# Patient Record
Sex: MALE | Race: WHITE | Employment: OTHER | URBAN - METROPOLITAN AREA
[De-identification: names, ages, dates, MRNs, and addresses within clinical notes are randomized per-mention and may not be internally consistent; named-entity substitution may affect disease eponyms.]

---

## 2022-05-27 ENCOUNTER — APPOINTMENT (OUTPATIENT)
Dept: GENERAL RADIOLOGY | Age: 77
DRG: 287 | End: 2022-05-27
Attending: STUDENT IN AN ORGANIZED HEALTH CARE EDUCATION/TRAINING PROGRAM
Payer: MEDICARE

## 2022-05-27 ENCOUNTER — HOSPITAL ENCOUNTER (INPATIENT)
Age: 77
LOS: 4 days | Discharge: HOME | DRG: 287 | End: 2022-05-31
Attending: EMERGENCY MEDICINE | Admitting: FAMILY MEDICINE
Payer: MEDICARE

## 2022-05-27 DIAGNOSIS — R94.39 ABNORMAL STRESS TEST: Primary | ICD-10-CM

## 2022-05-27 DIAGNOSIS — I20.0 UNSTABLE ANGINA (HCC): ICD-10-CM

## 2022-05-27 PROBLEM — I10 HTN (HYPERTENSION): Status: ACTIVE | Noted: 2022-05-27

## 2022-05-27 PROBLEM — R94.31 ABNORMAL ECG DURING EXERCISE STRESS TEST: Status: ACTIVE | Noted: 2022-05-27

## 2022-05-27 PROBLEM — E11.9 TYPE 2 DIABETES MELLITUS (HCC): Status: ACTIVE | Noted: 2022-05-27

## 2022-05-27 PROBLEM — I25.10 CAD (CORONARY ARTERY DISEASE): Status: ACTIVE | Noted: 2022-05-27

## 2022-05-27 LAB
ALBUMIN SERPL-MCNC: 3.7 G/DL (ref 3.4–5)
ALBUMIN/GLOB SERPL: 0.9 {RATIO} (ref 0.8–1.7)
ALP SERPL-CCNC: 71 U/L (ref 45–117)
ALT SERPL-CCNC: 26 U/L (ref 16–61)
ANION GAP SERPL CALC-SCNC: 5 MMOL/L (ref 3–18)
AST SERPL-CCNC: 31 U/L (ref 10–38)
BASOPHILS # BLD: 0 K/UL (ref 0–0.1)
BASOPHILS NFR BLD: 0 % (ref 0–2)
BILIRUB SERPL-MCNC: 0.5 MG/DL (ref 0.2–1)
BNP SERPL-MCNC: 29 PG/ML (ref 0–1800)
BUN SERPL-MCNC: 20 MG/DL (ref 7–18)
BUN/CREAT SERPL: 20 (ref 12–20)
CALCIUM SERPL-MCNC: 9.6 MG/DL (ref 8.5–10.1)
CHLORIDE SERPL-SCNC: 104 MMOL/L (ref 100–111)
CO2 SERPL-SCNC: 31 MMOL/L (ref 21–32)
CREAT SERPL-MCNC: 0.99 MG/DL (ref 0.6–1.3)
DIFFERENTIAL METHOD BLD: ABNORMAL
EOSINOPHIL # BLD: 0.5 K/UL (ref 0–0.4)
EOSINOPHIL NFR BLD: 5 % (ref 0–5)
ERYTHROCYTE [DISTWIDTH] IN BLOOD BY AUTOMATED COUNT: 12.7 % (ref 11.6–14.5)
GLOBULIN SER CALC-MCNC: 4 G/DL (ref 2–4)
GLUCOSE BLD STRIP.AUTO-MCNC: 187 MG/DL (ref 70–110)
GLUCOSE SERPL-MCNC: 106 MG/DL (ref 74–99)
HCT VFR BLD AUTO: 44.8 % (ref 36–48)
HGB BLD-MCNC: 14.8 G/DL (ref 13–16)
IMM GRANULOCYTES # BLD AUTO: 0 K/UL (ref 0–0.04)
IMM GRANULOCYTES NFR BLD AUTO: 0 % (ref 0–0.5)
LYMPHOCYTES # BLD: 2.2 K/UL (ref 0.9–3.6)
LYMPHOCYTES NFR BLD: 23 % (ref 21–52)
MCH RBC QN AUTO: 31.6 PG (ref 24–34)
MCHC RBC AUTO-ENTMCNC: 33 G/DL (ref 31–37)
MCV RBC AUTO: 95.5 FL (ref 78–100)
MONOCYTES # BLD: 1.2 K/UL (ref 0.05–1.2)
MONOCYTES NFR BLD: 13 % (ref 3–10)
NEUTS SEG # BLD: 5.7 K/UL (ref 1.8–8)
NEUTS SEG NFR BLD: 59 % (ref 40–73)
NRBC # BLD: 0 K/UL (ref 0–0.01)
NRBC BLD-RTO: 0 PER 100 WBC
PLATELET # BLD AUTO: 198 K/UL (ref 135–420)
PMV BLD AUTO: 9.5 FL (ref 9.2–11.8)
POTASSIUM SERPL-SCNC: 4.1 MMOL/L (ref 3.5–5.5)
PROT SERPL-MCNC: 7.7 G/DL (ref 6.4–8.2)
RBC # BLD AUTO: 4.69 M/UL (ref 4.35–5.65)
SODIUM SERPL-SCNC: 140 MMOL/L (ref 136–145)
TROPONIN-HIGH SENSITIVITY: 26 NG/L (ref 0–78)
WBC # BLD AUTO: 9.6 K/UL (ref 4.6–13.2)

## 2022-05-27 PROCEDURE — 71045 X-RAY EXAM CHEST 1 VIEW: CPT

## 2022-05-27 PROCEDURE — 74011636637 HC RX REV CODE- 636/637: Performed by: FAMILY MEDICINE

## 2022-05-27 PROCEDURE — 74011250636 HC RX REV CODE- 250/636: Performed by: FAMILY MEDICINE

## 2022-05-27 PROCEDURE — 65270000046 HC RM TELEMETRY

## 2022-05-27 PROCEDURE — 85025 COMPLETE CBC W/AUTO DIFF WBC: CPT

## 2022-05-27 PROCEDURE — 82962 GLUCOSE BLOOD TEST: CPT

## 2022-05-27 PROCEDURE — 74011000250 HC RX REV CODE- 250: Performed by: FAMILY MEDICINE

## 2022-05-27 PROCEDURE — 83036 HEMOGLOBIN GLYCOSYLATED A1C: CPT

## 2022-05-27 PROCEDURE — 83880 ASSAY OF NATRIURETIC PEPTIDE: CPT

## 2022-05-27 PROCEDURE — 84484 ASSAY OF TROPONIN QUANT: CPT

## 2022-05-27 PROCEDURE — 93005 ELECTROCARDIOGRAM TRACING: CPT

## 2022-05-27 PROCEDURE — 80053 COMPREHEN METABOLIC PANEL: CPT

## 2022-05-27 PROCEDURE — 99285 EMERGENCY DEPT VISIT HI MDM: CPT

## 2022-05-27 PROCEDURE — 74011250637 HC RX REV CODE- 250/637: Performed by: FAMILY MEDICINE

## 2022-05-27 RX ORDER — POLYETHYLENE GLYCOL 3350 17 G/17G
17 POWDER, FOR SOLUTION ORAL DAILY PRN
Status: DISCONTINUED | OUTPATIENT
Start: 2022-05-27 | End: 2022-05-31 | Stop reason: HOSPADM

## 2022-05-27 RX ORDER — ACETAMINOPHEN 650 MG/1
650 SUPPOSITORY RECTAL
Status: DISCONTINUED | OUTPATIENT
Start: 2022-05-27 | End: 2022-05-31 | Stop reason: HOSPADM

## 2022-05-27 RX ORDER — FAMOTIDINE 20 MG/1
20 TABLET, FILM COATED ORAL 2 TIMES DAILY
Status: DISCONTINUED | OUTPATIENT
Start: 2022-05-27 | End: 2022-05-31 | Stop reason: HOSPADM

## 2022-05-27 RX ORDER — DEXTROSE MONOHYDRATE 100 MG/ML
0-250 INJECTION, SOLUTION INTRAVENOUS AS NEEDED
Status: DISCONTINUED | OUTPATIENT
Start: 2022-05-27 | End: 2022-05-31 | Stop reason: HOSPADM

## 2022-05-27 RX ORDER — ACETAMINOPHEN 325 MG/1
650 TABLET ORAL
Status: DISCONTINUED | OUTPATIENT
Start: 2022-05-27 | End: 2022-05-31 | Stop reason: HOSPADM

## 2022-05-27 RX ORDER — OMEGA-3-ACID ETHYL ESTERS 1 G/1
1 CAPSULE, LIQUID FILLED ORAL 2 TIMES DAILY WITH MEALS
Status: DISCONTINUED | OUTPATIENT
Start: 2022-05-28 | End: 2022-05-31 | Stop reason: HOSPADM

## 2022-05-27 RX ORDER — ENOXAPARIN SODIUM 100 MG/ML
1 INJECTION SUBCUTANEOUS EVERY 12 HOURS
Status: DISCONTINUED | OUTPATIENT
Start: 2022-05-27 | End: 2022-05-30

## 2022-05-27 RX ORDER — ONDANSETRON 2 MG/ML
4 INJECTION INTRAMUSCULAR; INTRAVENOUS
Status: DISCONTINUED | OUTPATIENT
Start: 2022-05-27 | End: 2022-05-31 | Stop reason: HOSPADM

## 2022-05-27 RX ORDER — LOSARTAN POTASSIUM 50 MG/1
50 TABLET ORAL DAILY
Status: DISCONTINUED | OUTPATIENT
Start: 2022-05-28 | End: 2022-05-31 | Stop reason: HOSPADM

## 2022-05-27 RX ORDER — SODIUM CHLORIDE 0.9 % (FLUSH) 0.9 %
5-40 SYRINGE (ML) INJECTION AS NEEDED
Status: DISCONTINUED | OUTPATIENT
Start: 2022-05-27 | End: 2022-05-31 | Stop reason: HOSPADM

## 2022-05-27 RX ORDER — GUAIFENESIN 100 MG/5ML
81 LIQUID (ML) ORAL
Status: DISCONTINUED | OUTPATIENT
Start: 2022-05-27 | End: 2022-05-30

## 2022-05-27 RX ORDER — METOPROLOL TARTRATE 25 MG/1
25 TABLET, FILM COATED ORAL DAILY
Status: DISCONTINUED | OUTPATIENT
Start: 2022-05-28 | End: 2022-05-30

## 2022-05-27 RX ORDER — ATORVASTATIN CALCIUM 20 MG/1
80 TABLET, FILM COATED ORAL
Status: DISCONTINUED | OUTPATIENT
Start: 2022-05-27 | End: 2022-05-31 | Stop reason: HOSPADM

## 2022-05-27 RX ORDER — MAGNESIUM SULFATE 100 %
16 CRYSTALS MISCELLANEOUS AS NEEDED
Status: DISCONTINUED | OUTPATIENT
Start: 2022-05-27 | End: 2022-05-31 | Stop reason: HOSPADM

## 2022-05-27 RX ORDER — METOPROLOL TARTRATE 25 MG/1
25 TABLET, FILM COATED ORAL
COMMUNITY
End: 2022-05-31

## 2022-05-27 RX ORDER — OMEGA-3-ACID ETHYL ESTERS 1 G/1
2 CAPSULE, LIQUID FILLED ORAL 2 TIMES DAILY
COMMUNITY

## 2022-05-27 RX ORDER — LORATADINE 10 MG/1
10 TABLET ORAL DAILY
COMMUNITY

## 2022-05-27 RX ORDER — INSULIN LISPRO 100 [IU]/ML
INJECTION, SOLUTION INTRAVENOUS; SUBCUTANEOUS
Status: DISCONTINUED | OUTPATIENT
Start: 2022-05-27 | End: 2022-05-31 | Stop reason: HOSPADM

## 2022-05-27 RX ORDER — ONDANSETRON 4 MG/1
4 TABLET, ORALLY DISINTEGRATING ORAL
Status: DISCONTINUED | OUTPATIENT
Start: 2022-05-27 | End: 2022-05-31 | Stop reason: HOSPADM

## 2022-05-27 RX ORDER — SODIUM CHLORIDE 0.9 % (FLUSH) 0.9 %
5-40 SYRINGE (ML) INJECTION EVERY 8 HOURS
Status: DISCONTINUED | OUTPATIENT
Start: 2022-05-27 | End: 2022-05-31 | Stop reason: HOSPADM

## 2022-05-27 RX ADMIN — INSULIN LISPRO 2 UNITS: 100 INJECTION, SOLUTION INTRAVENOUS; SUBCUTANEOUS at 23:47

## 2022-05-27 RX ADMIN — ATORVASTATIN CALCIUM 80 MG: 20 TABLET, FILM COATED ORAL at 23:20

## 2022-05-27 RX ADMIN — FAMOTIDINE 20 MG: 20 TABLET, FILM COATED ORAL at 23:20

## 2022-05-27 RX ADMIN — SODIUM CHLORIDE, PRESERVATIVE FREE 10 ML: 5 INJECTION INTRAVENOUS at 22:00

## 2022-05-27 RX ADMIN — ASPIRIN 81 MG: 81 TABLET, CHEWABLE ORAL at 23:20

## 2022-05-27 RX ADMIN — ENOXAPARIN SODIUM 70 MG: 100 INJECTION SUBCUTANEOUS at 23:21

## 2022-05-27 RX ADMIN — OMEGA-3-ACID ETHYL ESTERS 1000 MG: 1 CAPSULE, LIQUID FILLED ORAL at 23:20

## 2022-05-27 NOTE — Clinical Note
TRANSFER - IN REPORT:     Verbal report received from: HR. Report consisted of patient's Situation, Background, Assessment and   Recommendations(SBAR). Opportunity for questions and clarification was provided. Assessment completed upon patient's arrival to unit and care assumed. Patient transported with a Registered Nurse.

## 2022-05-27 NOTE — Clinical Note
TRANSFER - OUT REPORT:     Verbal report given to: HR. Report consisted of patient's Situation, Background, Assessment and   Recommendations(SBAR). Opportunity for questions and clarification was provided. Patient transported with a Registered Nurse. Patient transported to: recovery.

## 2022-05-27 NOTE — ED PROVIDER NOTES
78-year-old male with a past medical history of diabetes hypertension high cholesterol and kidney stones presents to the emergency department because of an abnormal stress test.  Patient is a Dr. Leesa Saldaña patient. For the past 5 to 6 months he has noticed that when he exerts himself doing yard work he gets short of breath with after 20 minutes. Normally he goes to the gym on the treadmill and lifts weights but does not have issues when he is doing that.  2 months ago patient went to Dr. Leesa Saldaña and said that when he bends over he gets lightheaded and dizzy. Is only scheduled him for stress test today. It was reported that he had ST elevation with exertion. Patient has no pain during interview. Dr. Leesa Saldaña called him and told to come to the ER. He told him that he was going to be admitted. Patient takes an aspirin a day. He is in the room with his wife. Both are pleasant without any active issues. No other issues expressed. Past Medical History:   Diagnosis Date    Chronic kidney disease     stones    Diabetes ([facility] [address]     Hypertension     Tinnitus of both ears        Past Surgical History:   Procedure Laterality Date    HX ORTHOPAEDIC Left 1968    amputation thumb    HX UROLOGICAL  15    lithotripsy         History reviewed. No pertinent family history. Social History     Socioeconomic History    Marital status:      Spouse name: Not on file    Number of children: Not on file    Years of education: Not on file    Highest education level: Not on file   Occupational History    Not on file   Tobacco Use    Smoking status: Former Smoker     Quit date: 1972     Years since quittin.4    Smokeless tobacco: Never Used   Substance and Sexual Activity    Alcohol use:  Yes     Alcohol/week: 1.7 standard drinks     Types: 2 drink(s) per week    Drug use: No    Sexual activity: Not on file   Other Topics Concern    Not on file   Social History Narrative    Not on file Social Determinants of Health     Financial Resource Strain:     Difficulty of Paying Living Expenses: Not on file   Food Insecurity:     Worried About Running Out of Food in the Last Year: Not on file    Lynn of Food in the Last Year: Not on file   Transportation Needs:     Lack of Transportation (Medical): Not on file    Lack of Transportation (Non-Medical): Not on file   Physical Activity:     Days of Exercise per Week: Not on file    Minutes of Exercise per Session: Not on file   Stress:     Feeling of Stress : Not on file   Social Connections:     Frequency of Communication with Friends and Family: Not on file    Frequency of Social Gatherings with Friends and Family: Not on file    Attends Scientologist Services: Not on file    Active Member of [address] [place] [address] [employer] or Organizations: Not on file    Attends Club or Organization Meetings: Not on file    Marital Status: Not on file   Intimate Partner Violence:     Fear of Current or Ex-Partner: Not on file    Emotionally Abused: Not on file    Physically Abused: Not on file    Sexually Abused: Not on file   Housing Stability:     Unable to Pay for Housing in the Last Year: Not on file    Number of [place] in the Last Year: Not on file    Unstable Housing in the Last Year: Not on file         ALLERGIES: Patient has no known allergies. Review of Systems   Constitutional: Negative. HENT: Negative. Respiratory: Positive for shortness of breath. Cardiovascular: Negative. Gastrointestinal: Negative. Genitourinary: Negative. Musculoskeletal: Negative. Neurological: Positive for dizziness. Hematological: Negative. Psychiatric/Behavioral: Negative. All other systems reviewed and are negative. Vitals:    05/27/22 1829 05/27/22 1850   BP: 135/71 (!) 141/66   Pulse: (!) 57    Resp: 18    Temp: 98.8 °F (37.1 °C)    SpO2: 97% 98%   Weight: 74.4 kg (164 lb)             Physical Exam  Vitals and nursing note reviewed. Constitutional:       Appearance: He is well-developed. HENT:      Head: Normocephalic and atraumatic. Nose: Nose normal.   Eyes:      General: No scleral icterus. Right eye: No discharge. Left eye: No discharge. Conjunctiva/sclera: Conjunctivae normal.      Pupils: Pupils are equal, round, and reactive to light. Neck:      Thyroid: No thyromegaly. Vascular: No JVD. Trachea: No tracheal deviation. Cardiovascular:      Rate and Rhythm: Normal rate and regular rhythm. Heart sounds: Normal heart sounds. No murmur heard. No friction rub. No gallop. Pulmonary:      Effort: Pulmonary effort is normal. No respiratory distress. Breath sounds: Normal breath sounds. No stridor. No wheezing or rales. Chest:      Chest wall: No tenderness. Abdominal:      General: Bowel sounds are normal. There is no distension. Palpations: Abdomen is soft. There is no mass. Tenderness: There is no abdominal tenderness. There is no guarding or rebound. Musculoskeletal:         General: No tenderness. Normal range of motion. Cervical back: Normal range of motion and neck supple. Lymphadenopathy:      Cervical: No cervical adenopathy. Skin:     General: Skin is warm and dry. Capillary Refill: Capillary refill takes less than 2 seconds. Coloration: Skin is not pale. Findings: No erythema or rash. Neurological:      General: No focal deficit present. Mental Status: He is alert and oriented to person, place, and time. Cranial Nerves: No cranial nerve deficit. Motor: No abnormal muscle tone. Coordination: Coordination normal.      Deep Tendon Reflexes: Reflexes normal.   Psychiatric:         Behavior: Behavior normal.         Thought Content:  Thought content normal.         Judgment: Judgment normal.          MDM  Number of Diagnoses or Management Options  Abnormal stress test  Diagnosis management comments: Previous ED attending spoke to Dr. Ivar Cooks. Patient takes an aspirin a day. We will contact 70 if there is gross abnormality. EKG sinus bradycardia at 59 normal axis normal intervals. Patient has no complaints in the emergency department. Waiting for labs and chest x-ray will admit to telemetry service for catheterization. Differential diagnosis PE ACS unstable angina MI pneumothorax      Spoke to Dr. Bakari Peck who agrees to do admission. Dr. Villegas Nicely spoke to Dr. Ivar Cooks who is in the emergency department and talk to the patient. They are going to give beta-blocker full dose statin and Lovenox. Hospitalist will write for the medication. He already took his aspirin at home. From my standpoint there is nothing else for me to do.   Admit       Amount and/or Complexity of Data Reviewed  Clinical lab tests: ordered and reviewed  Tests in the radiology section of CPT®: ordered and reviewed  Tests in the medicine section of CPT®: reviewed and ordered  Discussion of test results with the performing providers: yes  Decide to obtain previous medical records or to obtain history from someone other than the patient: yes  Obtain history from someone other than the patient: yes  Review and summarize past medical records: yes  Independent visualization of images, tracings, or specimens: yes    Risk of Complications, Morbidity, and/or Mortality  Presenting problems: moderate  Diagnostic procedures: moderate  Management options: moderate    Patient Progress  Patient progress: stable         Procedures

## 2022-05-27 NOTE — Clinical Note
Contrast Dose Calculator:   Patient's age: 77.   Patient's sex: Male. Patient weight (kg) = 74.4. Creatinine level (mg/dL) = 0.88. Creatinine clearance (mL/min): 76.33. Max Contrast dose per Creatinine Cl calculator = 171.73 mL.

## 2022-05-27 NOTE — PROGRESS NOTES
Pt placed on HFNC in ED at this time:       05/27/22 1948   Vitals   Resp Rate 20   O2 Sat (%) 100 %   Oxygen Therapy   Pulse via Oximetry 100 beats per minute   O2 Device Heated; Hi flow nasal cannula   O2 Flow Rate (L/min) 50 l/min   O2 Temperature 93.2 °F (34 °C)   FIO2 (%) 21 %

## 2022-05-27 NOTE — ED TRIAGE NOTES
Patient states had stressed test with Dr. Micah Briones today advised to come to ED for abnormal results patient has no complaints

## 2022-05-28 LAB
ALBUMIN SERPL-MCNC: 3.1 G/DL (ref 3.4–5)
ALBUMIN/GLOB SERPL: 0.9 {RATIO} (ref 0.8–1.7)
ALP SERPL-CCNC: 62 U/L (ref 45–117)
ALT SERPL-CCNC: 23 U/L (ref 16–61)
ANION GAP SERPL CALC-SCNC: 4 MMOL/L (ref 3–18)
AST SERPL-CCNC: 27 U/L (ref 10–38)
ATRIAL RATE: 59 BPM
BILIRUB SERPL-MCNC: 0.5 MG/DL (ref 0.2–1)
BUN SERPL-MCNC: 20 MG/DL (ref 7–18)
BUN/CREAT SERPL: 22 (ref 12–20)
CALCIUM SERPL-MCNC: 9.1 MG/DL (ref 8.5–10.1)
CALCULATED P AXIS, ECG09: 15 DEGREES
CALCULATED R AXIS, ECG10: -22 DEGREES
CALCULATED T AXIS, ECG11: -4 DEGREES
CHLORIDE SERPL-SCNC: 106 MMOL/L (ref 100–111)
CO2 SERPL-SCNC: 31 MMOL/L (ref 21–32)
CREAT SERPL-MCNC: 0.92 MG/DL (ref 0.6–1.3)
DIAGNOSIS, 93000: NORMAL
ERYTHROCYTE [DISTWIDTH] IN BLOOD BY AUTOMATED COUNT: 12.6 % (ref 11.6–14.5)
GLOBULIN SER CALC-MCNC: 3.5 G/DL (ref 2–4)
GLUCOSE BLD STRIP.AUTO-MCNC: 111 MG/DL (ref 70–110)
GLUCOSE BLD STRIP.AUTO-MCNC: 118 MG/DL (ref 70–110)
GLUCOSE BLD STRIP.AUTO-MCNC: 123 MG/DL (ref 70–110)
GLUCOSE BLD STRIP.AUTO-MCNC: 93 MG/DL (ref 70–110)
GLUCOSE SERPL-MCNC: 106 MG/DL (ref 74–99)
HBA1C MFR BLD: 6.2 % (ref 4.2–5.6)
HCT VFR BLD AUTO: 41.3 % (ref 36–48)
HGB BLD-MCNC: 14 G/DL (ref 13–16)
MCH RBC QN AUTO: 31.8 PG (ref 24–34)
MCHC RBC AUTO-ENTMCNC: 33.9 G/DL (ref 31–37)
MCV RBC AUTO: 93.9 FL (ref 78–100)
NRBC # BLD: 0 K/UL (ref 0–0.01)
NRBC BLD-RTO: 0 PER 100 WBC
P-R INTERVAL, ECG05: 174 MS
PLATELET # BLD AUTO: 174 K/UL (ref 135–420)
PMV BLD AUTO: 9.7 FL (ref 9.2–11.8)
POTASSIUM SERPL-SCNC: 4 MMOL/L (ref 3.5–5.5)
PROT SERPL-MCNC: 6.6 G/DL (ref 6.4–8.2)
Q-T INTERVAL, ECG07: 420 MS
QRS DURATION, ECG06: 80 MS
QTC CALCULATION (BEZET), ECG08: 415 MS
RBC # BLD AUTO: 4.4 M/UL (ref 4.35–5.65)
SODIUM SERPL-SCNC: 141 MMOL/L (ref 136–145)
TROPONIN-HIGH SENSITIVITY: 12 NG/L (ref 0–78)
TROPONIN-HIGH SENSITIVITY: 17 NG/L (ref 0–78)
VENTRICULAR RATE, ECG03: 59 BPM
WBC # BLD AUTO: 10.9 K/UL (ref 4.6–13.2)

## 2022-05-28 PROCEDURE — 74011250636 HC RX REV CODE- 250/636: Performed by: FAMILY MEDICINE

## 2022-05-28 PROCEDURE — 74011000250 HC RX REV CODE- 250: Performed by: FAMILY MEDICINE

## 2022-05-28 PROCEDURE — 36415 COLL VENOUS BLD VENIPUNCTURE: CPT

## 2022-05-28 PROCEDURE — 80053 COMPREHEN METABOLIC PANEL: CPT

## 2022-05-28 PROCEDURE — 74011250637 HC RX REV CODE- 250/637: Performed by: FAMILY MEDICINE

## 2022-05-28 PROCEDURE — 84484 ASSAY OF TROPONIN QUANT: CPT

## 2022-05-28 PROCEDURE — 99232 SBSQ HOSP IP/OBS MODERATE 35: CPT | Performed by: INTERNAL MEDICINE

## 2022-05-28 PROCEDURE — 82962 GLUCOSE BLOOD TEST: CPT

## 2022-05-28 PROCEDURE — 65270000046 HC RM TELEMETRY

## 2022-05-28 PROCEDURE — 85027 COMPLETE CBC AUTOMATED: CPT

## 2022-05-28 RX ADMIN — ENOXAPARIN SODIUM 70 MG: 100 INJECTION SUBCUTANEOUS at 21:38

## 2022-05-28 RX ADMIN — ENOXAPARIN SODIUM 70 MG: 100 INJECTION SUBCUTANEOUS at 08:39

## 2022-05-28 RX ADMIN — OMEGA-3-ACID ETHYL ESTERS 1000 MG: 1 CAPSULE, LIQUID FILLED ORAL at 17:51

## 2022-05-28 RX ADMIN — ATORVASTATIN CALCIUM 80 MG: 20 TABLET, FILM COATED ORAL at 21:39

## 2022-05-28 RX ADMIN — METOPROLOL TARTRATE 25 MG: 25 TABLET, FILM COATED ORAL at 09:00

## 2022-05-28 RX ADMIN — FAMOTIDINE 20 MG: 20 TABLET, FILM COATED ORAL at 21:39

## 2022-05-28 RX ADMIN — SODIUM CHLORIDE, PRESERVATIVE FREE 10 ML: 5 INJECTION INTRAVENOUS at 06:53

## 2022-05-28 RX ADMIN — SODIUM CHLORIDE, PRESERVATIVE FREE 10 ML: 5 INJECTION INTRAVENOUS at 14:00

## 2022-05-28 RX ADMIN — ASPIRIN 81 MG: 81 TABLET, CHEWABLE ORAL at 21:40

## 2022-05-28 RX ADMIN — FAMOTIDINE 20 MG: 20 TABLET, FILM COATED ORAL at 08:39

## 2022-05-28 RX ADMIN — OMEGA-3-ACID ETHYL ESTERS 1000 MG: 1 CAPSULE, LIQUID FILLED ORAL at 08:39

## 2022-05-28 RX ADMIN — LOSARTAN POTASSIUM 50 MG: 50 TABLET, FILM COATED ORAL at 09:00

## 2022-05-28 RX ADMIN — SODIUM CHLORIDE, PRESERVATIVE FREE 5 ML: 5 INJECTION INTRAVENOUS at 21:40

## 2022-05-28 NOTE — PROGRESS NOTES
Shift uneventful. Bedside and Verbal shift change report given to [facility] RN (oncoming nurse) by Khadra Queen RN (offgoing nurse). Report included the following information SBAR, Kardex, MAR, Recent Results and Cardiac Rhythm .

## 2022-05-28 NOTE — PROGRESS NOTES
Problem: Patient Education: Go to Patient Education Activity  Goal: Patient/Family Education  Outcome: Progressing Towards Goal     Problem: Unstable angina/NSTEMI: Day of Admission/Day 1  Goal: Off Pathway (Use only if patient is Off Pathway)  Outcome: Progressing Towards Goal  Goal: Activity/Safety  Outcome: Progressing Towards Goal  Goal: Consults, if ordered  Outcome: Progressing Towards Goal  Goal: Diagnostic Test/Procedures  Outcome: Progressing Towards Goal  Goal: Nutrition/Diet  Outcome: Progressing Towards Goal  Goal: Medications  Outcome: Progressing Towards Goal  Goal: Respiratory  Outcome: Progressing Towards Goal  Goal: Treatments/Interventions/Procedures  Outcome: Progressing Towards Goal  Goal: Psychosocial  Outcome: Progressing Towards Goal  Goal: *Hemodynamically stable  Outcome: Progressing Towards Goal  Goal: *Optimal pain control at patient's stated goal  Outcome: Progressing Towards Goal  Goal: *Lungs clear or at baseline  Outcome: Progressing Towards Goal

## 2022-05-28 NOTE — PROGRESS NOTES
Hospitalist Progress Note    Patient: Cherie Lam MRN: [id number]  CSN: [id number]    YOB: 1945  Age: 77 y.o. Sex: male    DOA: 5/27/2022 LOS:  LOS: 1 day            Assessment/Plan   1. Unstable angina- chest pain free overnight, cardiac enzymes neg  2. HTN  3. DM2    Plan:  - lipitor, asa, metoprolol, tx dose lovenox  - monitro glucose  - for cath tuesday      Patient Active Problem List   Diagnosis Code    Family history of colon cancer Z80.0    Unstable angina ([facility] [address] I20.0    HTN (hypertension) I10    Abnormal ECG during exercise stress test R94.31    Type 2 diabetes mellitus (HCC) E11.9    CAD (coronary artery disease) I25.10               Subjective:    cc: \" im ok\"  No acute events overnight      REVIEW OF SYSTEMS:  General: No fevers or chills. Cardiovascular: No chest pain or pressure. No palpitations. Pulmonary: No shortness of breath. Gastrointestinal: No nausea, vomiting. Objective:        Vital signs/Intake and Output:  Visit Vitals  BP (!) 146/68 (BP 1 Location: Right upper arm, BP Patient Position: At rest)   Pulse (!) 55   Temp 98 °F (36.7 °C)   Resp 16   Wt 74.4 kg (164 lb)   SpO2 99%   BMI 24.22 kg/m²     Current Shift:  No intake/output data recorded. Last three shifts:  No intake/output data recorded. Body mass index is 24.22 kg/m².     Physical Exam:  GEN: Alert and oriented times three NAD  EYES: conjunctiva normal, lids with out lesions  HEENT: MMM, No thyromegaly, no lymphadenopathy  HEART: RRR +S1 +S2, no JVD, pulses 2+ distally  LUNGS: CTA B/L no wheezes, rales or rhonchi  ABDOMEN: + BS, soft NT/ND no organomegaly,  No rebound  EXTREMITIES: No edema cyanosis, cap refill normal   SKIN: no rashes or skin breakdown, no nodules, normal turgor  Current Facility-Administered Medications   Medication Dose Route Frequency    sodium chloride (NS) flush 5-40 mL  5-40 mL IntraVENous Q8H    sodium chloride (NS) flush 5-40 mL  5-40 mL IntraVENous PRN    acetaminophen (TYLENOL) tablet 650 mg  650 mg Oral Q6H PRN    Or    acetaminophen (TYLENOL) suppository 650 mg  650 mg Rectal Q6H PRN    polyethylene glycol (MIRALAX) packet 17 g  17 g Oral DAILY PRN    ondansetron (ZOFRAN ODT) tablet 4 mg  4 mg Oral Q8H PRN    Or    ondansetron (ZOFRAN) injection 4 mg  4 mg IntraVENous Q6H PRN    famotidine (PEPCID) tablet 20 mg  20 mg Oral BID    enoxaparin (LOVENOX) injection 70 mg  1 mg/kg SubCUTAneous Q12H    aspirin chewable tablet 81 mg  81 mg Oral QHS    atorvastatin (LIPITOR) tablet 80 mg  80 mg Oral QHS    losartan (COZAAR) tablet 50 mg  50 mg Oral DAILY    insulin lispro (HUMALOG) injection   SubCUTAneous AC&HS    glucose chewable tablet 16 g  16 g Oral PRN    glucagon (GLUCAGEN) injection 1 mg  1 mg IntraMUSCular PRN    dextrose 10% infusion 0-250 mL  0-250 mL IntraVENous PRN    metoprolol tartrate (LOPRESSOR) tablet 25 mg  25 mg Oral DAILY    omega-3 acid ethyl esters (LOVAZA) capsule 1,000 mg  1 g Oral BID WITH MEALS         All the patient's labs over the past 24 hours were reviewed both during my initial daily workflow process and at the time notated as \"note time\" in [facility]. (It is not time stamped separately in this workflow.)  Select labs are listed below.         Labs: Results:       Chemistry Recent Labs     05/28/22 0130 05/27/22  1839   * 106*    140   K 4.0 4.1    104   CO2 31 31   BUN 20* 20*   CREA 0.92 0.99   CA 9.1 9.6   AGAP 4 5   BUCR 22* 20   AP 62 71   TP 6.6 7.7   ALB 3.1* 3.7   GLOB 3.5 4.0   AGRAT 0.9 0.9      CBC w/Diff Recent Labs     05/28/22 0130 05/27/22  1839   WBC 10.9 9.6   RBC 4.40 4.69   HGB 14.0 14.8   HCT 41.3 44.8    198   GRANS  --  59   LYMPH  --  23   EOS  --  5                      Liver Enzymes Recent Labs     05/28/22 0130   TP 6.6   ALB 3.1*   AP 62          Procedures/imaging: see electronic medical records for all procedures/Xrays and details which were not copied into this note but were reviewed prior to creation of R Bart Fuchs 1, DO  Internal Medicine/Geriatrics

## 2022-05-28 NOTE — PROGRESS NOTES
Cardiology Progress Note      5/28/2022 6:48 PM    Admit Date: 5/27/2022    Admit Diagnosis: Unstable angina ([facility] [address] [I20.0]      Subjective: Radha Rodriguez denies chest pain, chest pressure/discomfort. Visit Vitals  /61 (BP 1 Location: Right upper arm, BP Patient Position: Sitting)   Pulse (!) 53   Temp 97.4 °F (36.3 °C)   Resp 18   Wt 74.4 kg (164 lb)   SpO2 97%   BMI 24.22 kg/m²     Current Facility-Administered Medications   Medication Dose Route Frequency    sodium chloride (NS) flush 5-40 mL  5-40 mL IntraVENous Q8H    sodium chloride (NS) flush 5-40 mL  5-40 mL IntraVENous PRN    acetaminophen (TYLENOL) tablet 650 mg  650 mg Oral Q6H PRN    Or    acetaminophen (TYLENOL) suppository 650 mg  650 mg Rectal Q6H PRN    polyethylene glycol (MIRALAX) packet 17 g  17 g Oral DAILY PRN    ondansetron (ZOFRAN ODT) tablet 4 mg  4 mg Oral Q8H PRN    Or    ondansetron (ZOFRAN) injection 4 mg  4 mg IntraVENous Q6H PRN    famotidine (PEPCID) tablet 20 mg  20 mg Oral BID    enoxaparin (LOVENOX) injection 70 mg  1 mg/kg SubCUTAneous Q12H    aspirin chewable tablet 81 mg  81 mg Oral QHS    atorvastatin (LIPITOR) tablet 80 mg  80 mg Oral QHS    losartan (COZAAR) tablet 50 mg  50 mg Oral DAILY    insulin lispro (HUMALOG) injection   SubCUTAneous AC&HS    glucose chewable tablet 16 g  16 g Oral PRN    glucagon (GLUCAGEN) injection 1 mg  1 mg IntraMUSCular PRN    dextrose 10% infusion 0-250 mL  0-250 mL IntraVENous PRN    metoprolol tartrate (LOPRESSOR) tablet 25 mg  25 mg Oral DAILY    omega-3 acid ethyl esters (LOVAZA) capsule 1,000 mg  1 g Oral BID WITH MEALS         Objective:      Physical Exam:  Visit Vitals  /61 (BP 1 Location: Right upper arm, BP Patient Position: Sitting)   Pulse (!) 53   Temp 97.4 °F (36.3 °C)   Resp 18   Wt 74.4 kg (164 lb)   SpO2 97%   BMI 24.22 kg/m²     General Appearance:  Well developed, well nourished,alert and oriented x 3, and individual in no acute distress. Ears/Nose/Mouth/Throat:   Hearing grossly normal.         Neck: Supple. Chest:   Lungs clear to auscultation bilaterally. Cardiovascular:  Regular rate and rhythm, S1, S2 normal, no murmur. Abdomen:   Soft, non-tender, bowel sounds are active. Extremities: No edema bilaterally. Skin: Warm and dry. Data Review:   Labs:    Recent Results (from the past 24 hour(s))   GLUCOSE, POC    Collection Time: 05/27/22 11:43 PM   Result Value Ref Range    Glucose (POC) 187 (H) 70 - 814 mg/dL   METABOLIC PANEL, COMPREHENSIVE    Collection Time: 05/28/22  1:30 AM   Result Value Ref Range    Sodium 141 136 - 145 mmol/L    Potassium 4.0 3.5 - 5.5 mmol/L    Chloride 106 100 - 111 mmol/L    CO2 31 21 - 32 mmol/L    Anion gap 4 3.0 - 18 mmol/L    Glucose 106 (H) 74 - 99 mg/dL    BUN 20 (H) 7.0 - 18 MG/DL    Creatinine 0.92 0.6 - 1.3 MG/DL    BUN/Creatinine ratio 22 (H) 12 - 20      GFR est AA >60 >60 ml/min/1.73m2    GFR est non-AA >60 >60 ml/min/1.73m2    Calcium 9.1 8.5 - 10.1 MG/DL    Bilirubin, total 0.5 0.2 - 1.0 MG/DL    ALT (SGPT) 23 16 - 61 U/L    AST (SGOT) 27 10 - 38 U/L    Alk.  phosphatase 62 45 - 117 U/L    Protein, total 6.6 6.4 - 8.2 g/dL    Albumin 3.1 (L) 3.4 - 5.0 g/dL    Globulin 3.5 2.0 - 4.0 g/dL    A-G Ratio 0.9 0.8 - 1.7     CBC W/O DIFF    Collection Time: 05/28/22  1:30 AM   Result Value Ref Range    WBC 10.9 4.6 - 13.2 K/uL    RBC 4.40 4.35 - 5.65 M/uL    HGB 14.0 13.0 - 16.0 g/dL    HCT 41.3 36.0 - 48.0 %    MCV 93.9 78.0 - 100.0 FL    MCH 31.8 24.0 - 34.0 PG    MCHC 33.9 31.0 - 37.0 g/dL    RDW 12.6 11.6 - 14.5 %    PLATELET 779 520 - 894 K/uL    MPV 9.7 9.2 - 11.8 FL    NRBC 0.0 0  WBC    ABSOLUTE NRBC 0.00 0.00 - 0.01 K/uL   TROPONIN-HIGH SENSITIVITY    Collection Time: 05/28/22  1:30 AM   Result Value Ref Range    Troponin-High Sensitivity 17 0 - 78 ng/L   GLUCOSE, POC    Collection Time: 05/28/22  6:41 AM   Result Value Ref Range    Glucose (POC) 93 70 - 110 mg/dL TROPONIN-HIGH SENSITIVITY    Collection Time: 05/28/22  8:42 AM   Result Value Ref Range    Troponin-High Sensitivity 12 0 - 78 ng/L   GLUCOSE, POC    Collection Time: 05/28/22 11:10 AM   Result Value Ref Range    Glucose (POC) 111 (H) 70 - 110 mg/dL   GLUCOSE, POC    Collection Time: 05/28/22  3:36 PM   Result Value Ref Range    Glucose (POC) 123 (H) 70 - 110 mg/dL       Telemetry: normal sinus rhythm      Assessment:     Principal Problem:    Abnormal ECG during exercise stress test (5/27/2022)    Active Problems:    [facility] (5/27/2022)      HTN (hypertension) (5/27/2022)      Type 2 diabetes mellitus ([facility] (5/27/2022)      CAD (coronary artery disease) (5/27/2022)        Plan:     Patient remained stable with no chest pain. Troponins are not elevated. EKG showed sinus bradycardia, otherwise normal.  We will continue to monitor. For cath on Tuesday by Dr. Prabha Weavre.     Ivet Hawley MD

## 2022-05-28 NOTE — H&P
History & Physical    Patient: Linda Thrasher MRN: [id number]  CSN: [id number]    YOB: 1945  Age: 77 y.o. Sex: male      DOA: 5/27/2022  Primary Care Provider:  Park Gauthier MD      Assessment/Plan     Patient Active Problem List   Diagnosis Code    Family history of colon cancer Z80.0    Unstable angina ([facility] I20.0    HTN (hypertension) I10    Abnormal ECG during exercise stress test R94.31    Type 2 diabetes mellitus ([facility] E11.9    CAD (coronary artery disease) I25.10     78 y/o male with HTN, CAD, and type 2 diabetes mellitus is admitted with an abnormal exercise stress test and unstable angina. Unstable angina with abnormal EKG on stress test  --Telemetry  --Trend troponins  --Echocardiogram  --Full dose lovenox 1 mg/kg BID  --Cardiology consult  --Cardiac cath on Tuesday    HTN  --Continue home meds    Type 2 diabetes melllitus  --Sliding scale insulin  --Diabetic diet    CAD  --Continue daily ASA    Type 2 diabetes mellitus-not requiring medication  --Sliding scale insulin  --Diabetic cardiac diet    Full code    Prophylaxis: pepcid PO, lovenox SQ    Estimated length of stay : 4 nights    Shantel Sullivan MD  Nocturnist  ----------------------------------------------------------------------------------------------------------------------------------------------------------------------------  CC: abnormal stress test       HPI:     Linda Thrasher is a 77 y.o. male with HTN, CAD, and type 2 diabetes mellitus presents to the ED at the request of Dr. Mark Christensen (cardiology) for an abnormal stress test with ST elevations during the test. When he was in the [employer] years ago he had a cardiac cath showing a 90% blockage. However, he wasn't able to get a stent placed as he was not on active duty. He has never had a subsequent cardiac cath. For the past 5 months he has had exertional fatigue particularly with mowing the lawn, which seems to be accelerating over time.  He was scheduled for a stress test today. He is asymptomatic and denies chest pain, shortness of breath, or leg swelling. Past Medical History:   Diagnosis Date    Chronic kidney disease     stones    Diabetes ([address] 2012    Hypertension 2014    Tinnitus of both ears        Past Surgical History:   Procedure Laterality Date    HX ORTHOPAEDIC Left 1968    amputation thumb    HX UROLOGICAL  14    lithotripsy       Family History   Problem Relation Age of Onset    Diabetes Mother     Heart Disease Mother     Diabetes Father     Cancer Sister        Social History     Socioeconomic History    Marital status:    Tobacco Use    Smoking status: Former Smoker     Quit date: 1972     Years since quittin.4    Smokeless tobacco: Never Used   Vaping Use    Vaping Use: Never used   Substance and Sexual Activity    Alcohol use: Yes     Alcohol/week: 4.0 - 5.0 standard drinks     Types: 2 Standard drinks or equivalent, 2 - 3 Glasses of wine per week    Drug use: No       Prior to Admission medications    Medication Sig Start Date End Date Taking? Authorizing Provider   omega-3 acid ethyl esters (LOVAZA) 1 gram capsule Take 2 g by mouth two (2) times a day. Yes Provider, Historical   metoprolol tartrate (LOPRESSOR) 25 mg tablet Take 25 mg by mouth once over twenty-four (24) hours. Yes Provider, Historical   loratadine (CLARITIN) 10 mg tablet Take 10 mg by mouth daily. Yes Provider, Historical   losartan (COZAAR) 50 mg tablet Take 50 mg by mouth daily. Yes Provider, Historical   atorvastatin (LIPITOR) 80 mg tablet Take 80 mg by mouth nightly. Indications: HYPERCHOLESTEROLEMIA   Yes Provider, Historical   Aspirin, Buffered 81 mg tab Take  by mouth nightly. Yes Provider, Historical   metFORMIN (GLUCOPHAGE) 1,000 mg tablet Take 500 mg by mouth daily (with dinner).  Indications: TYPE 2 DIABETES MELLITUS  Patient not taking: Reported on 2022    Provider, Historical       No Known Allergies    Review of Systems  Gen: No fever, chills, malaise, weight loss/gain. Heent: No headache, rhinorrhea, sore throat. Heart: No chest pain, palpitations, ROMERO, pnd, or orthopnea. Resp: No cough, hemoptysis, wheezing and shortness of breath. GI: No nausea, vomiting, diarrhea, constipation, melena or hematochezia. : No urinary obstruction, dysuria or hematuria. Derm: No rash, new skin lesion or pruritis. Musc/skeletal: no bone or joint complaints. Vasc: No edema, cyanosis or claudication. Endo: No heat/cold intolerance, no polyuria,polydipsia or polyphagia. Neuro: No unilateral weakness, numbness, tingling. No seizures. Heme: No easy bruising or bleeding. Physical Exam:     Physical Exam:  Visit Vitals  /70 (BP 1 Location: Right upper arm, BP Patient Position: Sitting)   Pulse (!) 51   Temp 97.4 °F (36.3 °C)   Resp 16   Wt 74.4 kg (164 lb)   SpO2 99%   BMI 24.22 kg/m²           Temp (24hrs), Av.1 °F (36.7 °C), Min:97.4 °F (36.3 °C), Max:98.8 °F (37.1 °C)    No intake/output data recorded. No intake/output data recorded. General:  Awake, pleasant and cooperative, no distress. Head:  Normocephalic, without obvious abnormality, atraumatic. Eyes:  Conjunctivae/corneas clear, sclera anicteric. Neck: Supple, symmetrical, trachea midline. Lungs:   Clear to auscultation bilaterally. Heart:  Regular rate and rhythm, S1, S2 normal, no murmur, click, rub or gallop. Abdomen: Soft, non-tender. Bowel sounds normal. No masses,  No organomegaly. Extremities: Extremities normal, atraumatic, no cyanosis or edema. Capillary refill normal.   Pulses: 2+ and symmetric all extremities. Skin: Skin color pink, turgor normal. No rashes or lesions   Neurologic: No focal motor or sensory deficit. Labs Reviewed: All lab results for the last 24 hours reviewed.   Recent Results (from the past 24 hour(s))   EKG, 12 LEAD, INITIAL    Collection Time: 22  6:38 PM   Result Value Ref Range Ventricular Rate 59 BPM    Atrial Rate 59 BPM    P-R Interval 174 ms    QRS Duration 80 ms    Q-T Interval 420 ms    QTC Calculation (Bezet) 415 ms    Calculated P Axis 15 degrees    Calculated R Axis -22 degrees    Calculated T Axis -4 degrees    Diagnosis       Sinus bradycardia  Otherwise normal ECG  No previous ECGs available     CBC WITH AUTOMATED DIFF    Collection Time: 05/27/22  6:39 PM   Result Value Ref Range    WBC 9.6 4.6 - 13.2 K/uL    RBC 4.69 4.35 - 5.65 M/uL    HGB 14.8 13.0 - 16.0 g/dL    HCT 44.8 36.0 - 48.0 %    MCV 95.5 78.0 - 100.0 FL    MCH 31.6 24.0 - 34.0 PG    MCHC 33.0 31.0 - 37.0 g/dL    RDW 12.7 11.6 - 14.5 %    PLATELET 218 081 - 458 K/uL    MPV 9.5 9.2 - 11.8 FL    NRBC 0.0 0  WBC    ABSOLUTE NRBC 0.00 0.00 - 0.01 K/uL    NEUTROPHILS 59 40 - 73 %    LYMPHOCYTES 23 21 - 52 %    MONOCYTES 13 (H) 3 - 10 %    EOSINOPHILS 5 0 - 5 %    BASOPHILS 0 0 - 2 %    IMMATURE GRANULOCYTES 0 0.0 - 0.5 %    ABS. NEUTROPHILS 5.7 1.8 - 8.0 K/UL    ABS. LYMPHOCYTES 2.2 0.9 - 3.6 K/UL    ABS. MONOCYTES 1.2 0.05 - 1.2 K/UL    ABS. EOSINOPHILS 0.5 (H) 0.0 - 0.4 K/UL    ABS. BASOPHILS 0.0 0.0 - 0.1 K/UL    ABS. IMM. GRANS. 0.0 0.00 - 0.04 K/UL    DF AUTOMATED     METABOLIC PANEL, COMPREHENSIVE    Collection Time: 05/27/22  6:39 PM   Result Value Ref Range    Sodium 140 136 - 145 mmol/L    Potassium 4.1 3.5 - 5.5 mmol/L    Chloride 104 100 - 111 mmol/L    CO2 31 21 - 32 mmol/L    Anion gap 5 3.0 - 18 mmol/L    Glucose 106 (H) 74 - 99 mg/dL    BUN 20 (H) 7.0 - 18 MG/DL    Creatinine 0.99 0.6 - 1.3 MG/DL    BUN/Creatinine ratio 20 12 - 20      GFR est AA >60 >60 ml/min/1.73m2    GFR est non-AA >60 >60 ml/min/1.73m2    Calcium 9.6 8.5 - 10.1 MG/DL    Bilirubin, total 0.5 0.2 - 1.0 MG/DL    ALT (SGPT) 26 16 - 61 U/L    AST (SGOT) 31 10 - 38 U/L    Alk.  phosphatase 71 45 - 117 U/L    Protein, total 7.7 6.4 - 8.2 g/dL    Albumin 3.7 3.4 - 5.0 g/dL    Globulin 4.0 2.0 - 4.0 g/dL    A-G Ratio 0.9 0.8 - 1.7 TROPONIN-HIGH SENSITIVITY    Collection Time: 05/27/22  6:39 PM   Result Value Ref Range    Troponin-High Sensitivity 26 0 - 78 ng/L   NT-PRO BNP    Collection Time: 05/27/22  6:39 PM   Result Value Ref Range    NT pro-BNP 29 0 - 1,800 PG/ML   GLUCOSE, POC    Collection Time: 05/27/22 11:43 PM   Result Value Ref Range    Glucose (POC) 187 (H) 70 - 110 mg/dL     Results  XR CHEST PORT (Accession [id number]) (Order [id number])    Allergies       No Known Allergies     Exam Information    Status Exam Begun  Exam Ended    Final [99] 5/27/2022 18:41 5/27/2022  6:57 PM [id number]  6:57 PM     Result Information    Status: Final result (Exam End: 5/27/2022 18:57) Provider Status: Open       XR CHEST PORT: Patient Communication     Released  Not seen     Study Result    Narrative & Impression   EXAM:  AP Portable Chest X-ray 1 view      INDICATION: Chest pain     COMPARISON: None     _______________     FINDINGS:  Heart and mediastinal contours are within normal limits for portable  radiograph. There are increased reticular interstitial lung markings  bilaterally. No focal airspace disease. There are no pleural effusions. No acute  osseous findings.     ________________        IMPRESSION  Increased reticular interstitial lung markings probably lung apices. This may represent chronic lung changes however superimposed interstitial  pneumonia not excluded.

## 2022-05-28 NOTE — PROGRESS NOTES
78-year-old gentleman came to my office for outpatient exercise nuclear stress test for CAD with angina. He exercised for 10 minutes on treadmill with Timothy protocol and an of significant shortness of breath. EKG revealed ST elevation in inferior leads during exercise which resolved completely during recovery and patient was asymptomatic. Nuclear images revealed fixed inferior perfusion defect. In view of significant ST elevation during exercise patient was called back and advised to come to the emergency room. Echocardiogram was done in my office revealed no wall motion abnormality and normal EF few weeks ago. Serial EKG with cardiac enzymes Q 6-8 hours x3   Aspirin, beta-blocker, statin and full dose of Lovenox. Admit to telemetry unit. Plan discussed with patient and family member. Plan discussed with   Patient will be seen by Dr. Jesusita Amezquita during hospitalization on weekend  Plan discussed with ER physician and Dr. Milton James.

## 2022-05-29 LAB
ALBUMIN SERPL-MCNC: 3 G/DL (ref 3.4–5)
ALBUMIN/GLOB SERPL: 0.8 {RATIO} (ref 0.8–1.7)
ALP SERPL-CCNC: 64 U/L (ref 45–117)
ALT SERPL-CCNC: 22 U/L (ref 16–61)
ANION GAP SERPL CALC-SCNC: 4 MMOL/L (ref 3–18)
AST SERPL-CCNC: 23 U/L (ref 10–38)
BILIRUB SERPL-MCNC: 0.4 MG/DL (ref 0.2–1)
BUN SERPL-MCNC: 21 MG/DL (ref 7–18)
BUN/CREAT SERPL: 20 (ref 12–20)
CALCIUM SERPL-MCNC: 9.2 MG/DL (ref 8.5–10.1)
CHLORIDE SERPL-SCNC: 106 MMOL/L (ref 100–111)
CO2 SERPL-SCNC: 30 MMOL/L (ref 21–32)
CREAT SERPL-MCNC: 1.03 MG/DL (ref 0.6–1.3)
ERYTHROCYTE [DISTWIDTH] IN BLOOD BY AUTOMATED COUNT: 12.6 % (ref 11.6–14.5)
GLOBULIN SER CALC-MCNC: 3.8 G/DL (ref 2–4)
GLUCOSE BLD STRIP.AUTO-MCNC: 108 MG/DL (ref 70–110)
GLUCOSE BLD STRIP.AUTO-MCNC: 109 MG/DL (ref 70–110)
GLUCOSE BLD STRIP.AUTO-MCNC: 120 MG/DL (ref 70–110)
GLUCOSE BLD STRIP.AUTO-MCNC: 123 MG/DL (ref 70–110)
GLUCOSE SERPL-MCNC: 118 MG/DL (ref 74–99)
HCT VFR BLD AUTO: 40.9 % (ref 36–48)
HGB BLD-MCNC: 13.7 G/DL (ref 13–16)
MCH RBC QN AUTO: 31.6 PG (ref 24–34)
MCHC RBC AUTO-ENTMCNC: 33.5 G/DL (ref 31–37)
MCV RBC AUTO: 94.5 FL (ref 78–100)
NRBC # BLD: 0 K/UL (ref 0–0.01)
NRBC BLD-RTO: 0 PER 100 WBC
PLATELET # BLD AUTO: 171 K/UL (ref 135–420)
PMV BLD AUTO: 10.1 FL (ref 9.2–11.8)
POTASSIUM SERPL-SCNC: 3.8 MMOL/L (ref 3.5–5.5)
PROT SERPL-MCNC: 6.8 G/DL (ref 6.4–8.2)
RBC # BLD AUTO: 4.33 M/UL (ref 4.35–5.65)
SODIUM SERPL-SCNC: 140 MMOL/L (ref 136–145)
WBC # BLD AUTO: 8.3 K/UL (ref 4.6–13.2)

## 2022-05-29 PROCEDURE — 74011250636 HC RX REV CODE- 250/636: Performed by: FAMILY MEDICINE

## 2022-05-29 PROCEDURE — 80053 COMPREHEN METABOLIC PANEL: CPT

## 2022-05-29 PROCEDURE — 99232 SBSQ HOSP IP/OBS MODERATE 35: CPT | Performed by: INTERNAL MEDICINE

## 2022-05-29 PROCEDURE — 82962 GLUCOSE BLOOD TEST: CPT

## 2022-05-29 PROCEDURE — 65270000046 HC RM TELEMETRY

## 2022-05-29 PROCEDURE — 85027 COMPLETE CBC AUTOMATED: CPT

## 2022-05-29 PROCEDURE — 74011000250 HC RX REV CODE- 250: Performed by: FAMILY MEDICINE

## 2022-05-29 PROCEDURE — 74011250637 HC RX REV CODE- 250/637: Performed by: FAMILY MEDICINE

## 2022-05-29 PROCEDURE — 36415 COLL VENOUS BLD VENIPUNCTURE: CPT

## 2022-05-29 RX ADMIN — SODIUM CHLORIDE, PRESERVATIVE FREE 10 ML: 5 INJECTION INTRAVENOUS at 14:00

## 2022-05-29 RX ADMIN — ASPIRIN 81 MG: 81 TABLET, CHEWABLE ORAL at 21:01

## 2022-05-29 RX ADMIN — OMEGA-3-ACID ETHYL ESTERS 1000 MG: 1 CAPSULE, LIQUID FILLED ORAL at 08:54

## 2022-05-29 RX ADMIN — METOPROLOL TARTRATE 25 MG: 25 TABLET, FILM COATED ORAL at 08:54

## 2022-05-29 RX ADMIN — ATORVASTATIN CALCIUM 80 MG: 20 TABLET, FILM COATED ORAL at 21:01

## 2022-05-29 RX ADMIN — FAMOTIDINE 20 MG: 20 TABLET, FILM COATED ORAL at 08:54

## 2022-05-29 RX ADMIN — ENOXAPARIN SODIUM 70 MG: 100 INJECTION SUBCUTANEOUS at 08:54

## 2022-05-29 RX ADMIN — LOSARTAN POTASSIUM 50 MG: 50 TABLET, FILM COATED ORAL at 08:54

## 2022-05-29 RX ADMIN — ENOXAPARIN SODIUM 70 MG: 100 INJECTION SUBCUTANEOUS at 20:59

## 2022-05-29 RX ADMIN — FAMOTIDINE 20 MG: 20 TABLET, FILM COATED ORAL at 21:01

## 2022-05-29 RX ADMIN — OMEGA-3-ACID ETHYL ESTERS 1000 MG: 1 CAPSULE, LIQUID FILLED ORAL at 17:21

## 2022-05-29 RX ADMIN — SODIUM CHLORIDE, PRESERVATIVE FREE 5 ML: 5 INJECTION INTRAVENOUS at 21:01

## 2022-05-29 RX ADMIN — SODIUM CHLORIDE, PRESERVATIVE FREE 10 ML: 5 INJECTION INTRAVENOUS at 06:14

## 2022-05-29 NOTE — PROGRESS NOTES
Problem: Patient Education: Go to Patient Education Activity  Goal: Patient/Family Education  Outcome: Progressing Towards Goal     Problem: Unstable angina/NSTEMI: Day 2  Goal: Off Pathway (Use only if patient is Off Pathway)  Outcome: Progressing Towards Goal  Goal: Activity/Safety  Outcome: Progressing Towards Goal  Goal: Consults, if ordered  Outcome: Progressing Towards Goal  Goal: Diagnostic Test/Procedures  Outcome: Progressing Towards Goal  Goal: Nutrition/Diet  Outcome: Progressing Towards Goal  Goal: Medications  Outcome: Progressing Towards Goal  Goal: Respiratory  Outcome: Progressing Towards Goal  Goal: Treatments/Interventions/Procedures  Outcome: Progressing Towards Goal  Goal: Psychosocial  Outcome: Progressing Towards Goal  Goal: *Hemodynamically stable  Outcome: Progressing Towards Goal  Goal: *Optimal pain control at patient's stated goal  Outcome: Progressing Towards Goal  Goal: *Lungs clear or at baseline  Outcome: Progressing Towards Goal

## 2022-05-29 NOTE — PROGRESS NOTES
Cardiology Progress Note      5/29/2022 6:48 PM    Admit Date: 5/27/2022    Admit Diagnosis: Unstable angina ([facility] [I20.0]      Subjective: Jesus Manuel Mendoza denies chest pain, chest pressure/discomfort. Visit Vitals  /70 (BP 1 Location: Right upper arm, BP Patient Position: Sitting)   Pulse (!) 56   Temp 97.4 °F (36.3 °C)   Resp 16   Wt 74.4 kg (164 lb)   SpO2 97%   BMI 24.22 kg/m²     Current Facility-Administered Medications   Medication Dose Route Frequency    sodium chloride (NS) flush 5-40 mL  5-40 mL IntraVENous Q8H    sodium chloride (NS) flush 5-40 mL  5-40 mL IntraVENous PRN    acetaminophen (TYLENOL) tablet 650 mg  650 mg Oral Q6H PRN    Or    acetaminophen (TYLENOL) suppository 650 mg  650 mg Rectal Q6H PRN    polyethylene glycol (MIRALAX) packet 17 g  17 g Oral DAILY PRN    ondansetron (ZOFRAN ODT) tablet 4 mg  4 mg Oral Q8H PRN    Or    ondansetron (ZOFRAN) injection 4 mg  4 mg IntraVENous Q6H PRN    famotidine (PEPCID) tablet 20 mg  20 mg Oral BID    enoxaparin (LOVENOX) injection 70 mg  1 mg/kg SubCUTAneous Q12H    aspirin chewable tablet 81 mg  81 mg Oral QHS    atorvastatin (LIPITOR) tablet 80 mg  80 mg Oral QHS    losartan (COZAAR) tablet 50 mg  50 mg Oral DAILY    insulin lispro (HUMALOG) injection   SubCUTAneous AC&HS    glucose chewable tablet 16 g  16 g Oral PRN    glucagon (GLUCAGEN) injection 1 mg  1 mg IntraMUSCular PRN    dextrose 10% infusion 0-250 mL  0-250 mL IntraVENous PRN    metoprolol tartrate (LOPRESSOR) tablet 25 mg  25 mg Oral DAILY    omega-3 acid ethyl esters (LOVAZA) capsule 1,000 mg  1 g Oral BID WITH MEALS         Objective:      Physical Exam:  Visit Vitals  /70 (BP 1 Location: Right upper arm, BP Patient Position: Sitting)   Pulse (!) 56   Temp 97.4 °F (36.3 °C)   Resp 16   Wt 74.4 kg (164 lb)   SpO2 97%   BMI 24.22 kg/m²     General Appearance:  Well developed, well nourished,alert and oriented x 3, and individual in no acute distress. Ears/Nose/Mouth/Throat:   Hearing grossly normal.         Neck: Supple. Chest:   Lungs clear to auscultation bilaterally. Cardiovascular:  Regular rate and rhythm, S1, S2 normal, no murmur. Abdomen:   Soft, non-tender, bowel sounds are active. Extremities: No edema bilaterally. Skin: Warm and dry. Data Review:   Labs:    Recent Results (from the past 24 hour(s))   GLUCOSE, POC    Collection Time: 05/28/22  3:36 PM   Result Value Ref Range    Glucose (POC) 123 (H) 70 - 110 mg/dL   GLUCOSE, POC    Collection Time: 05/28/22  9:25 PM   Result Value Ref Range    Glucose (POC) 118 (H) 70 - 173 mg/dL   METABOLIC PANEL, COMPREHENSIVE    Collection Time: 05/29/22  2:10 AM   Result Value Ref Range    Sodium 140 136 - 145 mmol/L    Potassium 3.8 3.5 - 5.5 mmol/L    Chloride 106 100 - 111 mmol/L    CO2 30 21 - 32 mmol/L    Anion gap 4 3.0 - 18 mmol/L    Glucose 118 (H) 74 - 99 mg/dL    BUN 21 (H) 7.0 - 18 MG/DL    Creatinine 1.03 0.6 - 1.3 MG/DL    BUN/Creatinine ratio 20 12 - 20      GFR est AA >60 >60 ml/min/1.73m2    GFR est non-AA >60 >60 ml/min/1.73m2    Calcium 9.2 8.5 - 10.1 MG/DL    Bilirubin, total 0.4 0.2 - 1.0 MG/DL    ALT (SGPT) 22 16 - 61 U/L    AST (SGOT) 23 10 - 38 U/L    Alk.  phosphatase 64 45 - 117 U/L    Protein, total 6.8 6.4 - 8.2 g/dL    Albumin 3.0 (L) 3.4 - 5.0 g/dL    Globulin 3.8 2.0 - 4.0 g/dL    A-G Ratio 0.8 0.8 - 1.7     CBC W/O DIFF    Collection Time: 05/29/22  2:10 AM   Result Value Ref Range    WBC 8.3 4.6 - 13.2 K/uL    RBC 4.33 (L) 4.35 - 5.65 M/uL    HGB 13.7 13.0 - 16.0 g/dL    HCT 40.9 36.0 - 48.0 %    MCV 94.5 78.0 - 100.0 FL    MCH 31.6 24.0 - 34.0 PG    MCHC 33.5 31.0 - 37.0 g/dL    RDW 12.6 11.6 - 14.5 %    PLATELET 577 989 - 247 K/uL    MPV 10.1 9.2 - 11.8 FL    NRBC 0.0 0  WBC    ABSOLUTE NRBC 0.00 0.00 - 0.01 K/uL   GLUCOSE, POC    Collection Time: 05/29/22  8:51 AM   Result Value Ref Range    Glucose (POC) 123 (H) 70 - 110 mg/dL   GLUCOSE, POC Collection Time: 05/29/22 12:34 PM   Result Value Ref Range    Glucose (POC) 108 70 - 110 mg/dL       Telemetry: normal sinus rhythm      Assessment:     Principal Problem:    Abnormal ECG during exercise stress test (5/27/2022)    Active Problems:    Unstable angina ([facility] (5/27/2022)      HTN (hypertension) (5/27/2022)      Type 2 diabetes mellitus ([facility] (5/27/2022)      CAD (coronary artery disease) (5/27/2022)        Plan:   5/29/2022  Patient remains free of chest pain. Cardiac cath planned for Tuesday by Dr. Tamara Sinclair. Patient remained stable with no chest pain. Troponins are not elevated. EKG showed sinus bradycardia, otherwise normal.  We will continue to monitor. For cath on Tuesday by Dr. Tamara Sinclair.     Vee Gracia MD

## 2022-05-29 NOTE — PROGRESS NOTES
Hospitalist Progress Note    Patient: Romario Phillips MRN: [id number]  CSN: [id number]    YOB: 1945  Age: 77 y.o. Sex: male    DOA: 5/27/2022 LOS:  LOS: 2 days            Assessment/Plan   1. Unstable angina- chest pain free overnight, cardiac enzymes neg  2. HTN  3. DM2     Plan:  - lipitor, asa, metoprolol, tx dose lovenox  - monitor glucose  - for cath tuesday    Patient Active Problem List   Diagnosis Code    Family history of colon cancer Z80.0    Unstable angina ([facility] I20.0    HTN (hypertension) I10    Abnormal ECG during exercise stress test R94.31    Type 2 diabetes mellitus (HCC) E11.9    CAD (coronary artery disease) I25.10               Subjective:    cc: \" im ok\"  No acute events overnight        REVIEW OF SYSTEMS:  General: No fevers or chills. Cardiovascular: No chest pain or pressure. No palpitations. Pulmonary: No shortness of breath. Gastrointestinal: No nausea, vomiting. Objective:        Vital signs/Intake and Output:  Visit Vitals  /70 (BP 1 Location: Right upper arm, BP Patient Position: Sitting)   Pulse (!) 56   Temp 97.4 °F (36.3 °C)   Resp 16   Wt 74.4 kg (164 lb)   SpO2 97%   BMI 24.22 kg/m²     Current Shift:  No intake/output data recorded. Last three shifts:  05/27 1901 - 05/29 0700  In: 480 [P.O.:480]  Out: -     Body mass index is 24.22 kg/m².     Physical Exam:  GEN: Alert and oriented times three NAD  EYES: conjunctiva normal, lids with out lesions  HEENT: MMM, No thyromegaly, no lymphadenopathy  HEART: RRR +S1 +S2, no JVD, pulses 2+ distally  LUNGS: CTA B/L no wheezes, rales or rhonchi  ABDOMEN: + BS, soft NT/ND no organomegaly,  No rebound  EXTREMITIES: No edema cyanosis, cap refill normal   SKIN: no rashes or skin breakdown, no nodules, normal turgor  Current Facility-Administered Medications   Medication Dose Route Frequency    sodium chloride (NS) flush 5-40 mL  5-40 mL IntraVENous Q8H    sodium chloride (NS) flush 5-40 mL  5-40 mL IntraVENous PRN    acetaminophen (TYLENOL) tablet 650 mg  650 mg Oral Q6H PRN    Or    acetaminophen (TYLENOL) suppository 650 mg  650 mg Rectal Q6H PRN    polyethylene glycol (MIRALAX) packet 17 g  17 g Oral DAILY PRN    ondansetron (ZOFRAN ODT) tablet 4 mg  4 mg Oral Q8H PRN    Or    ondansetron (ZOFRAN) injection 4 mg  4 mg IntraVENous Q6H PRN    famotidine (PEPCID) tablet 20 mg  20 mg Oral BID    enoxaparin (LOVENOX) injection 70 mg  1 mg/kg SubCUTAneous Q12H    aspirin chewable tablet 81 mg  81 mg Oral QHS    atorvastatin (LIPITOR) tablet 80 mg  80 mg Oral QHS    losartan (COZAAR) tablet 50 mg  50 mg Oral DAILY    insulin lispro (HUMALOG) injection   SubCUTAneous AC&HS    glucose chewable tablet 16 g  16 g Oral PRN    glucagon (GLUCAGEN) injection 1 mg  1 mg IntraMUSCular PRN    dextrose 10% infusion 0-250 mL  0-250 mL IntraVENous PRN    metoprolol tartrate (LOPRESSOR) tablet 25 mg  25 mg Oral DAILY    omega-3 acid ethyl esters (LOVAZA) capsule 1,000 mg  1 g Oral BID WITH MEALS         All the patient's labs over the past 24 hours were reviewed both during my initial daily workflow process and at the time notated as \"note time\" in [facility]. (It is not time stamped separately in this workflow.)  Select labs are listed below.         Labs: Results:       Chemistry Recent Labs     05/29/22 0210 05/28/22  0130 05/27/22  1839   * 106* 106*    141 140   K 3.8 4.0 4.1    106 104   CO2 30 31 31   BUN 21* 20* 20*   CREA 1.03 0.92 0.99   CA 9.2 9.1 9.6   AGAP 4 4 5   BUCR 20 22* 20   AP 64 62 71   TP 6.8 6.6 7.7   ALB 3.0* 3.1* 3.7   GLOB 3.8 3.5 4.0   AGRAT 0.8 0.9 0.9      CBC w/Diff Recent Labs     05/29/22  0210 05/28/22  0130 05/27/22  1839   WBC 8.3 10.9 9.6   RBC 4.33* 4.40 4.69   HGB 13.7 14.0 14.8   HCT 40.9 41.3 44.8    174 198   GRANS  --   --  59   LYMPH  --   --  23   EOS  --   --  5                      Liver Enzymes Recent Labs 05/29/22  0210   TP 6.8   ALB 3.0*   AP 64          Procedures/imaging: see electronic medical records for all procedures/Xrays and details which were not copied into this note but were reviewed prior to creation of Arely [facility], DO  Internal Medicine/Geriatrics

## 2022-05-29 NOTE — ROUTINE PROCESS
Bedside and Verbal shift change report given to [facility] RN (oncoming nurse) by Petar Martínez RN (offgoing nurse). Report included the following information SBAR, Kardex, MAR, Recent Results and Cardiac Rhythm .

## 2022-05-30 LAB
ALBUMIN SERPL-MCNC: 3 G/DL (ref 3.4–5)
ALBUMIN/GLOB SERPL: 0.8 {RATIO} (ref 0.8–1.7)
ALP SERPL-CCNC: 60 U/L (ref 45–117)
ALT SERPL-CCNC: 22 U/L (ref 16–61)
ANION GAP SERPL CALC-SCNC: 4 MMOL/L (ref 3–18)
AST SERPL-CCNC: 23 U/L (ref 10–38)
BILIRUB SERPL-MCNC: 0.5 MG/DL (ref 0.2–1)
BUN SERPL-MCNC: 23 MG/DL (ref 7–18)
BUN/CREAT SERPL: 24 (ref 12–20)
CALCIUM SERPL-MCNC: 9.1 MG/DL (ref 8.5–10.1)
CHLORIDE SERPL-SCNC: 106 MMOL/L (ref 100–111)
CO2 SERPL-SCNC: 30 MMOL/L (ref 21–32)
CREAT SERPL-MCNC: 0.97 MG/DL (ref 0.6–1.3)
ERYTHROCYTE [DISTWIDTH] IN BLOOD BY AUTOMATED COUNT: 12.6 % (ref 11.6–14.5)
GLOBULIN SER CALC-MCNC: 3.7 G/DL (ref 2–4)
GLUCOSE BLD STRIP.AUTO-MCNC: 106 MG/DL (ref 70–110)
GLUCOSE BLD STRIP.AUTO-MCNC: 108 MG/DL (ref 70–110)
GLUCOSE BLD STRIP.AUTO-MCNC: 117 MG/DL (ref 70–110)
GLUCOSE BLD STRIP.AUTO-MCNC: 119 MG/DL (ref 70–110)
GLUCOSE SERPL-MCNC: 110 MG/DL (ref 74–99)
HCT VFR BLD AUTO: 39.9 % (ref 36–48)
HGB BLD-MCNC: 13.7 G/DL (ref 13–16)
MCH RBC QN AUTO: 31.4 PG (ref 24–34)
MCHC RBC AUTO-ENTMCNC: 34.3 G/DL (ref 31–37)
MCV RBC AUTO: 91.5 FL (ref 78–100)
NRBC # BLD: 0 K/UL (ref 0–0.01)
NRBC BLD-RTO: 0 PER 100 WBC
PLATELET # BLD AUTO: 167 K/UL (ref 135–420)
PMV BLD AUTO: 9.9 FL (ref 9.2–11.8)
POTASSIUM SERPL-SCNC: 3.7 MMOL/L (ref 3.5–5.5)
PROT SERPL-MCNC: 6.7 G/DL (ref 6.4–8.2)
RBC # BLD AUTO: 4.36 M/UL (ref 4.35–5.65)
SODIUM SERPL-SCNC: 140 MMOL/L (ref 136–145)
WBC # BLD AUTO: 8 K/UL (ref 4.6–13.2)

## 2022-05-30 PROCEDURE — 74011000250 HC RX REV CODE- 250: Performed by: FAMILY MEDICINE

## 2022-05-30 PROCEDURE — 85027 COMPLETE CBC AUTOMATED: CPT

## 2022-05-30 PROCEDURE — 82962 GLUCOSE BLOOD TEST: CPT

## 2022-05-30 PROCEDURE — 74011250636 HC RX REV CODE- 250/636: Performed by: FAMILY MEDICINE

## 2022-05-30 PROCEDURE — 74011250636 HC RX REV CODE- 250/636: Performed by: INTERNAL MEDICINE

## 2022-05-30 PROCEDURE — 36415 COLL VENOUS BLD VENIPUNCTURE: CPT

## 2022-05-30 PROCEDURE — 65270000046 HC RM TELEMETRY

## 2022-05-30 PROCEDURE — 99232 SBSQ HOSP IP/OBS MODERATE 35: CPT | Performed by: INTERNAL MEDICINE

## 2022-05-30 PROCEDURE — 80053 COMPREHEN METABOLIC PANEL: CPT

## 2022-05-30 PROCEDURE — 74011250637 HC RX REV CODE- 250/637: Performed by: FAMILY MEDICINE

## 2022-05-30 RX ORDER — SODIUM CHLORIDE 9 MG/ML
125 INJECTION, SOLUTION INTRAVENOUS CONTINUOUS
Status: DISCONTINUED | OUTPATIENT
Start: 2022-05-30 | End: 2022-05-30

## 2022-05-30 RX ORDER — ASPIRIN 81 MG/1
81 TABLET ORAL DAILY
Status: DISCONTINUED | OUTPATIENT
Start: 2022-05-31 | End: 2022-05-31 | Stop reason: HOSPADM

## 2022-05-30 RX ORDER — SODIUM CHLORIDE 9 MG/ML
75 INJECTION, SOLUTION INTRAVENOUS CONTINUOUS
Status: DISCONTINUED | OUTPATIENT
Start: 2022-05-30 | End: 2022-05-31

## 2022-05-30 RX ORDER — METOPROLOL TARTRATE 25 MG/1
12.5 TABLET, FILM COATED ORAL EVERY 12 HOURS
Status: DISCONTINUED | OUTPATIENT
Start: 2022-05-31 | End: 2022-05-31

## 2022-05-30 RX ADMIN — SODIUM CHLORIDE, PRESERVATIVE FREE 10 ML: 5 INJECTION INTRAVENOUS at 21:20

## 2022-05-30 RX ADMIN — ATORVASTATIN CALCIUM 80 MG: 20 TABLET, FILM COATED ORAL at 21:12

## 2022-05-30 RX ADMIN — FAMOTIDINE 20 MG: 20 TABLET, FILM COATED ORAL at 21:12

## 2022-05-30 RX ADMIN — FAMOTIDINE 20 MG: 20 TABLET, FILM COATED ORAL at 10:46

## 2022-05-30 RX ADMIN — SODIUM CHLORIDE, PRESERVATIVE FREE 5 ML: 5 INJECTION INTRAVENOUS at 05:29

## 2022-05-30 RX ADMIN — SODIUM CHLORIDE 75 ML/HR: 9 INJECTION, SOLUTION INTRAVENOUS at 21:16

## 2022-05-30 RX ADMIN — OMEGA-3-ACID ETHYL ESTERS 1000 MG: 1 CAPSULE, LIQUID FILLED ORAL at 10:46

## 2022-05-30 RX ADMIN — METOPROLOL TARTRATE 25 MG: 25 TABLET, FILM COATED ORAL at 10:46

## 2022-05-30 RX ADMIN — ASPIRIN 81 MG: 81 TABLET, CHEWABLE ORAL at 21:12

## 2022-05-30 RX ADMIN — ENOXAPARIN SODIUM 70 MG: 100 INJECTION SUBCUTANEOUS at 10:46

## 2022-05-30 RX ADMIN — ENOXAPARIN SODIUM 70 MG: 100 INJECTION SUBCUTANEOUS at 21:12

## 2022-05-30 RX ADMIN — LOSARTAN POTASSIUM 50 MG: 50 TABLET, FILM COATED ORAL at 10:46

## 2022-05-30 NOTE — PROGRESS NOTES
Hospitalist Progress Note    Patient: Danilo Lockwood MRN: [id number]  CSN: [id number]    YOB: 1945  Age: 77 y.o. Sex: male    DOA: 5/27/2022 LOS:  LOS: 3 days            Assessment/Plan     1. Unstable angina- chest pain free overnight, cardiac enzymes neg  2. HTN  3. DM2     Plan:  - lipitor, asa, metoprolol, tx dose lovenox  - monitor glucose  - for cath tuesday       Patient Active Problem List   Diagnosis Code    Family history of colon cancer Z80.0    Unstable angina ([address] I20.0    HTN (hypertension) I10    Abnormal ECG during exercise stress test R94.31    Type 2 diabetes mellitus (HCC) E11.9    CAD (coronary artery disease) I25.10               Subjective:    cc: abnormal stress test  No acute events overnight        REVIEW OF SYSTEMS:  General: No fevers or chills. Cardiovascular: No chest pain or pressure. No palpitations. Pulmonary: No shortness of breath. Gastrointestinal: No nausea, vomiting. Objective:        Vital signs/Intake and Output:  Visit Vitals  BP (!) 114/57 (BP 1 Location: Left arm, BP Patient Position: Standing)   Pulse 84   Temp 97.6 °F (36.4 °C)   Resp 16   Wt 74.4 kg (164 lb)   SpO2 96%   BMI 24.22 kg/m²     Current Shift:  No intake/output data recorded. Last three shifts:  05/28 1901 - 05/30 0700  In: 480 [P.O.:480]  Out: -     Body mass index is 24.22 kg/m².     Physical Exam:  GEN: Alert and oriented times three NAD  EYES: conjunctiva normal, lids with out lesions  HEENT: MMM, No thyromegaly, no lymphadenopathy  HEART: RRR +S1 +S2, no JVD, pulses 2+ distally  LUNGS: CTA B/L no wheezes, rales or rhonchi  ABDOMEN: + BS, soft NT/ND no organomegaly,  No rebound  EXTREMITIES: No edema cyanosis, cap refill normal   SKIN: no rashes or skin breakdown, no nodules, normal turgor  Current Facility-Administered Medications   Medication Dose Route Frequency    sodium chloride (NS) flush 5-40 mL  5-40 mL IntraVENous Q8H    sodium chloride (NS) flush 5-40 mL  5-40 mL IntraVENous PRN    acetaminophen (TYLENOL) tablet 650 mg  650 mg Oral Q6H PRN    Or    acetaminophen (TYLENOL) suppository 650 mg  650 mg Rectal Q6H PRN    polyethylene glycol (MIRALAX) packet 17 g  17 g Oral DAILY PRN    ondansetron (ZOFRAN ODT) tablet 4 mg  4 mg Oral Q8H PRN    Or    ondansetron (ZOFRAN) injection 4 mg  4 mg IntraVENous Q6H PRN    famotidine (PEPCID) tablet 20 mg  20 mg Oral BID    enoxaparin (LOVENOX) injection 70 mg  1 mg/kg SubCUTAneous Q12H    aspirin chewable tablet 81 mg  81 mg Oral QHS    atorvastatin (LIPITOR) tablet 80 mg  80 mg Oral QHS    losartan (COZAAR) tablet 50 mg  50 mg Oral DAILY    insulin lispro (HUMALOG) injection   SubCUTAneous AC&HS    glucose chewable tablet 16 g  16 g Oral PRN    glucagon (GLUCAGEN) injection 1 mg  1 mg IntraMUSCular PRN    dextrose 10% infusion 0-250 mL  0-250 mL IntraVENous PRN    metoprolol tartrate (LOPRESSOR) tablet 25 mg  25 mg Oral DAILY    omega-3 acid ethyl esters (LOVAZA) capsule 1,000 mg  1 g Oral BID WITH MEALS         All the patient's labs over the past 24 hours were reviewed both during my initial daily workflow process and at the time notated as \"note time\" in [facility]. (It is not time stamped separately in this workflow.)  Select labs are listed below.         Labs: Results:       Chemistry Recent Labs     05/30/22  0320 05/29/22 0210 05/28/22  0130   * 118* 106*    140 141   K 3.7 3.8 4.0    106 106   CO2 30 30 31   BUN 23* 21* 20*   CREA 0.97 1.03 0.92   CA 9.1 9.2 9.1   AGAP 4 4 4   BUCR 24* 20 22*   AP 60 64 62   TP 6.7 6.8 6.6   ALB 3.0* 3.0* 3.1*   GLOB 3.7 3.8 3.5   AGRAT 0.8 0.8 0.9      CBC w/Diff Recent Labs     05/30/22  0320 05/29/22  0210 05/28/22  0130 05/27/22 1839 05/27/22 1839   WBC 8.0 8.3 10.9   < > 9.6   RBC 4.36 4.33* 4.40   < > 4.69   HGB 13.7 13.7 14.0   < > 14.8   HCT 39.9 40.9 41.3   < > 44.8    171 174   < > 198   GRANS  --   -- --   --  59   LYMPH  --   --   --   --  23   EOS  --   --   --   --  5    < > = values in this interval not displayed.                       Liver Enzymes Recent Labs     05/30/22  0320   TP 6.7   ALB 3.0*   AP 60          Procedures/imaging: see electronic medical records for all procedures/Xrays and details which were not copied into this note but were reviewed prior to creation of Plan    Imaging personally reviewed:              Oksana Bernard,   Internal Medicine/Geriatrics

## 2022-05-30 NOTE — PROGRESS NOTES
Cardiology Progress Note      5/30/2022 6:48 PM    Admit Date: 5/27/2022    Admit Diagnosis: Unstable angina ([facility] [I20.0]      Subjective: Juan Perales denies chest pain, chest pressure/discomfort. Visit Vitals  /72   Pulse (!) 59   Temp 97.8 °F (36.6 °C)   Resp 16   Wt 74.4 kg (164 lb)   SpO2 98%   BMI 24.22 kg/m²     Current Facility-Administered Medications   Medication Dose Route Frequency    sodium chloride (NS) flush 5-40 mL  5-40 mL IntraVENous Q8H    sodium chloride (NS) flush 5-40 mL  5-40 mL IntraVENous PRN    acetaminophen (TYLENOL) tablet 650 mg  650 mg Oral Q6H PRN    Or    acetaminophen (TYLENOL) suppository 650 mg  650 mg Rectal Q6H PRN    polyethylene glycol (MIRALAX) packet 17 g  17 g Oral DAILY PRN    ondansetron (ZOFRAN ODT) tablet 4 mg  4 mg Oral Q8H PRN    Or    ondansetron (ZOFRAN) injection 4 mg  4 mg IntraVENous Q6H PRN    famotidine (PEPCID) tablet 20 mg  20 mg Oral BID    enoxaparin (LOVENOX) injection 70 mg  1 mg/kg SubCUTAneous Q12H    aspirin chewable tablet 81 mg  81 mg Oral QHS    atorvastatin (LIPITOR) tablet 80 mg  80 mg Oral QHS    losartan (COZAAR) tablet 50 mg  50 mg Oral DAILY    insulin lispro (HUMALOG) injection   SubCUTAneous AC&HS    glucose chewable tablet 16 g  16 g Oral PRN    glucagon (GLUCAGEN) injection 1 mg  1 mg IntraMUSCular PRN    dextrose 10% infusion 0-250 mL  0-250 mL IntraVENous PRN    metoprolol tartrate (LOPRESSOR) tablet 25 mg  25 mg Oral DAILY    omega-3 acid ethyl esters (LOVAZA) capsule 1,000 mg  1 g Oral BID WITH MEALS         Objective:      Physical Exam:  Visit Vitals  /72   Pulse (!) 59   Temp 97.8 °F (36.6 °C)   Resp 16   Wt 74.4 kg (164 lb)   SpO2 98%   BMI 24.22 kg/m²     General Appearance:  Well developed, well nourished,alert and oriented x 3, and individual in no acute distress. Ears/Nose/Mouth/Throat:   Hearing grossly normal.         Neck: Supple. Chest:   Lungs clear to auscultation bilaterally. Cardiovascular:  Regular rate and rhythm, S1, S2 normal, no murmur. Abdomen:   Soft, non-tender, bowel sounds are active. Extremities: No edema bilaterally. Skin: Warm and dry. Data Review:   Labs:    Recent Results (from the past 24 hour(s))   GLUCOSE, POC    Collection Time: 05/29/22  3:56 PM   Result Value Ref Range    Glucose (POC) 120 (H) 70 - 110 mg/dL   GLUCOSE, POC    Collection Time: 05/29/22  9:04 PM   Result Value Ref Range    Glucose (POC) 109 70 - 373 mg/dL   METABOLIC PANEL, COMPREHENSIVE    Collection Time: 05/30/22  3:20 AM   Result Value Ref Range    Sodium 140 136 - 145 mmol/L    Potassium 3.7 3.5 - 5.5 mmol/L    Chloride 106 100 - 111 mmol/L    CO2 30 21 - 32 mmol/L    Anion gap 4 3.0 - 18 mmol/L    Glucose 110 (H) 74 - 99 mg/dL    BUN 23 (H) 7.0 - 18 MG/DL    Creatinine 0.97 0.6 - 1.3 MG/DL    BUN/Creatinine ratio 24 (H) 12 - 20      GFR est AA >60 >60 ml/min/1.73m2    GFR est non-AA >60 >60 ml/min/1.73m2    Calcium 9.1 8.5 - 10.1 MG/DL    Bilirubin, total 0.5 0.2 - 1.0 MG/DL    ALT (SGPT) 22 16 - 61 U/L    AST (SGOT) 23 10 - 38 U/L    Alk.  phosphatase 60 45 - 117 U/L    Protein, total 6.7 6.4 - 8.2 g/dL    Albumin 3.0 (L) 3.4 - 5.0 g/dL    Globulin 3.7 2.0 - 4.0 g/dL    A-G Ratio 0.8 0.8 - 1.7     CBC W/O DIFF    Collection Time: 05/30/22  3:20 AM   Result Value Ref Range    WBC 8.0 4.6 - 13.2 K/uL    RBC 4.36 4.35 - 5.65 M/uL    HGB 13.7 13.0 - 16.0 g/dL    HCT 39.9 36.0 - 48.0 %    MCV 91.5 78.0 - 100.0 FL    MCH 31.4 24.0 - 34.0 PG    MCHC 34.3 31.0 - 37.0 g/dL    RDW 12.6 11.6 - 14.5 %    PLATELET 357 213 - 172 K/uL    MPV 9.9 9.2 - 11.8 FL    NRBC 0.0 0  WBC    ABSOLUTE NRBC 0.00 0.00 - 0.01 K/uL   GLUCOSE, POC    Collection Time: 05/30/22  8:01 AM   Result Value Ref Range    Glucose (POC) 108 70 - 110 mg/dL   GLUCOSE, POC    Collection Time: 05/30/22 11:21 AM   Result Value Ref Range    Glucose (POC) 106 70 - 110 mg/dL       Telemetry: normal sinus rhythm      Assessment:     Principal Problem:    Abnormal ECG during exercise stress test (5/27/2022)    Active Problems:    Unstable angina ([facility] (5/27/2022)      HTN (hypertension) (5/27/2022)      Type 2 diabetes mellitus ([facility] (5/27/2022)      CAD (coronary artery disease) (5/27/2022)        Plan:   5/30/2022  Patient remains chest pain-free and is asymptomatic. The plan is for cath tomorrow by Dr. Gaby Gordon. We will keep n.p.o. past midnight and start normal saline to maintain hydration. 5/29/2022  Patient remains free of chest pain. Cardiac cath planned for Tuesday by Dr. Gaby Gordon. Patient remained stable with no chest pain. Troponins are not elevated. EKG showed sinus bradycardia, otherwise normal.  We will continue to monitor. For cath on Tuesday by Dr. Gaby Gordon.     Jhoana Ramírez MD

## 2022-05-31 VITALS
BODY MASS INDEX: 24.29 KG/M2 | HEART RATE: 53 BPM | OXYGEN SATURATION: 97 % | TEMPERATURE: 97.7 F | WEIGHT: 164 LBS | DIASTOLIC BLOOD PRESSURE: 54 MMHG | SYSTOLIC BLOOD PRESSURE: 135 MMHG | HEIGHT: 69 IN | RESPIRATION RATE: 18 BRPM

## 2022-05-31 LAB
ALBUMIN SERPL-MCNC: 3.1 G/DL (ref 3.4–5)
ALBUMIN/GLOB SERPL: 0.9 {RATIO} (ref 0.8–1.7)
ALP SERPL-CCNC: 59 U/L (ref 45–117)
ALT SERPL-CCNC: 52 U/L (ref 16–61)
ANION GAP SERPL CALC-SCNC: 4 MMOL/L (ref 3–18)
AST SERPL-CCNC: 57 U/L (ref 10–38)
BASOPHILS # BLD: 0 K/UL (ref 0–0.1)
BASOPHILS NFR BLD: 0 % (ref 0–2)
BILIRUB SERPL-MCNC: 0.3 MG/DL (ref 0.2–1)
BUN SERPL-MCNC: 23 MG/DL (ref 7–18)
BUN/CREAT SERPL: 26 (ref 12–20)
CALCIUM SERPL-MCNC: 9.1 MG/DL (ref 8.5–10.1)
CHLORIDE SERPL-SCNC: 107 MMOL/L (ref 100–111)
CO2 SERPL-SCNC: 30 MMOL/L (ref 21–32)
CREAT SERPL-MCNC: 0.88 MG/DL (ref 0.6–1.3)
DIFFERENTIAL METHOD BLD: ABNORMAL
EOSINOPHIL # BLD: 0.4 K/UL (ref 0–0.4)
EOSINOPHIL NFR BLD: 5 % (ref 0–5)
ERYTHROCYTE [DISTWIDTH] IN BLOOD BY AUTOMATED COUNT: 12.7 % (ref 11.6–14.5)
GLOBULIN SER CALC-MCNC: 3.6 G/DL (ref 2–4)
GLUCOSE BLD STRIP.AUTO-MCNC: 105 MG/DL (ref 70–110)
GLUCOSE BLD STRIP.AUTO-MCNC: 108 MG/DL (ref 70–110)
GLUCOSE BLD STRIP.AUTO-MCNC: 152 MG/DL (ref 70–110)
GLUCOSE SERPL-MCNC: 124 MG/DL (ref 74–99)
HCT VFR BLD AUTO: 39.4 % (ref 36–48)
HGB BLD-MCNC: 13.3 G/DL (ref 13–16)
IMM GRANULOCYTES # BLD AUTO: 0 K/UL (ref 0–0.04)
IMM GRANULOCYTES NFR BLD AUTO: 0 % (ref 0–0.5)
INR PPP: 1.4 (ref 0.8–1.2)
LYMPHOCYTES # BLD: 1.8 K/UL (ref 0.9–3.6)
LYMPHOCYTES NFR BLD: 23 % (ref 21–52)
MCH RBC QN AUTO: 30.9 PG (ref 24–34)
MCHC RBC AUTO-ENTMCNC: 33.8 G/DL (ref 31–37)
MCV RBC AUTO: 91.6 FL (ref 78–100)
MONOCYTES # BLD: 0.9 K/UL (ref 0.05–1.2)
MONOCYTES NFR BLD: 12 % (ref 3–10)
NEUTS SEG # BLD: 4.7 K/UL (ref 1.8–8)
NEUTS SEG NFR BLD: 60 % (ref 40–73)
NRBC # BLD: 0 K/UL (ref 0–0.01)
NRBC BLD-RTO: 0 PER 100 WBC
PLATELET # BLD AUTO: 179 K/UL (ref 135–420)
PMV BLD AUTO: 10 FL (ref 9.2–11.8)
POTASSIUM SERPL-SCNC: 3.6 MMOL/L (ref 3.5–5.5)
PROT SERPL-MCNC: 6.7 G/DL (ref 6.4–8.2)
PROTHROMBIN TIME: 17.8 SEC (ref 11.5–15.2)
RBC # BLD AUTO: 4.3 M/UL (ref 4.35–5.65)
SODIUM SERPL-SCNC: 141 MMOL/L (ref 136–145)
WBC # BLD AUTO: 7.9 K/UL (ref 4.6–13.2)

## 2022-05-31 PROCEDURE — 74011000250 HC RX REV CODE- 250: Performed by: INTERNAL MEDICINE

## 2022-05-31 PROCEDURE — 74011250637 HC RX REV CODE- 250/637: Performed by: FAMILY MEDICINE

## 2022-05-31 PROCEDURE — C1769 GUIDE WIRE: HCPCS | Performed by: INTERNAL MEDICINE

## 2022-05-31 PROCEDURE — 77030008543 HC TBNG MON PRSS MRTM -A: Performed by: INTERNAL MEDICINE

## 2022-05-31 PROCEDURE — 80053 COMPREHEN METABOLIC PANEL: CPT

## 2022-05-31 PROCEDURE — 85025 COMPLETE CBC W/AUTO DIFF WBC: CPT

## 2022-05-31 PROCEDURE — 99153 MOD SED SAME PHYS/QHP EA: CPT | Performed by: INTERNAL MEDICINE

## 2022-05-31 PROCEDURE — 77030004522 HC CATH ANGI DX EXPO BSC -A: Performed by: INTERNAL MEDICINE

## 2022-05-31 PROCEDURE — 36415 COLL VENOUS BLD VENIPUNCTURE: CPT

## 2022-05-31 PROCEDURE — C1894 INTRO/SHEATH, NON-LASER: HCPCS | Performed by: INTERNAL MEDICINE

## 2022-05-31 PROCEDURE — 93458 L HRT ARTERY/VENTRICLE ANGIO: CPT | Performed by: INTERNAL MEDICINE

## 2022-05-31 PROCEDURE — 4A023N7 MEASUREMENT OF CARDIAC SAMPLING AND PRESSURE, LEFT HEART, PERCUTANEOUS APPROACH: ICD-10-PCS | Performed by: INTERNAL MEDICINE

## 2022-05-31 PROCEDURE — 74011000636 HC RX REV CODE- 636: Performed by: INTERNAL MEDICINE

## 2022-05-31 PROCEDURE — 77030010221 HC SPLNT WR POS TELE -B: Performed by: INTERNAL MEDICINE

## 2022-05-31 PROCEDURE — 85610 PROTHROMBIN TIME: CPT

## 2022-05-31 PROCEDURE — 82962 GLUCOSE BLOOD TEST: CPT

## 2022-05-31 PROCEDURE — 74011250636 HC RX REV CODE- 250/636: Performed by: INTERNAL MEDICINE

## 2022-05-31 PROCEDURE — 74011250637 HC RX REV CODE- 250/637: Performed by: INTERNAL MEDICINE

## 2022-05-31 PROCEDURE — B2111ZZ FLUOROSCOPY OF MULTIPLE CORONARY ARTERIES USING LOW OSMOLAR CONTRAST: ICD-10-PCS | Performed by: INTERNAL MEDICINE

## 2022-05-31 PROCEDURE — 99152 MOD SED SAME PHYS/QHP 5/>YRS: CPT | Performed by: INTERNAL MEDICINE

## 2022-05-31 PROCEDURE — 77030013797 HC KT TRNSDUC PRSSR EDWD -A: Performed by: INTERNAL MEDICINE

## 2022-05-31 RX ORDER — HEPARIN SODIUM 1000 [USP'U]/ML
INJECTION, SOLUTION INTRAVENOUS; SUBCUTANEOUS AS NEEDED
Status: DISCONTINUED | OUTPATIENT
Start: 2022-05-31 | End: 2022-05-31 | Stop reason: HOSPADM

## 2022-05-31 RX ORDER — NITROGLYCERIN 0.4 MG/1
0.4 TABLET SUBLINGUAL AS NEEDED
Status: DISCONTINUED | OUTPATIENT
Start: 2022-05-31 | End: 2022-05-31 | Stop reason: HOSPADM

## 2022-05-31 RX ORDER — RANOLAZINE 500 MG/1
500 TABLET, EXTENDED RELEASE ORAL 2 TIMES DAILY
Qty: 60 TABLET | Refills: 2 | Status: SHIPPED | OUTPATIENT
Start: 2022-05-31

## 2022-05-31 RX ORDER — RANOLAZINE 500 MG/1
500 TABLET, EXTENDED RELEASE ORAL 2 TIMES DAILY
Qty: 60 TABLET | Refills: 2 | Status: SHIPPED | OUTPATIENT
Start: 2022-05-31 | End: 2022-05-31 | Stop reason: SDUPTHER

## 2022-05-31 RX ORDER — METOPROLOL TARTRATE 25 MG/1
12.5 TABLET, FILM COATED ORAL EVERY 12 HOURS
Qty: 60 TABLET | Refills: 1 | Status: SHIPPED | OUTPATIENT
Start: 2022-05-31

## 2022-05-31 RX ORDER — MIDAZOLAM HYDROCHLORIDE 1 MG/ML
INJECTION, SOLUTION INTRAMUSCULAR; INTRAVENOUS AS NEEDED
Status: DISCONTINUED | OUTPATIENT
Start: 2022-05-31 | End: 2022-05-31 | Stop reason: HOSPADM

## 2022-05-31 RX ORDER — METOPROLOL TARTRATE 25 MG/1
12.5 TABLET, FILM COATED ORAL EVERY 12 HOURS
Status: DISCONTINUED | OUTPATIENT
Start: 2022-05-31 | End: 2022-05-31 | Stop reason: HOSPADM

## 2022-05-31 RX ORDER — HEPARIN SODIUM 200 [USP'U]/100ML
INJECTION, SOLUTION INTRAVENOUS
Status: COMPLETED | OUTPATIENT
Start: 2022-05-31 | End: 2022-05-31

## 2022-05-31 RX ORDER — METOPROLOL TARTRATE 25 MG/1
12.5 TABLET, FILM COATED ORAL EVERY 12 HOURS
Qty: 60 TABLET | Refills: 1 | Status: SHIPPED | OUTPATIENT
Start: 2022-05-31 | End: 2022-05-31 | Stop reason: SDUPTHER

## 2022-05-31 RX ORDER — VERAPAMIL HYDROCHLORIDE 2.5 MG/ML
INJECTION, SOLUTION INTRAVENOUS AS NEEDED
Status: DISCONTINUED | OUTPATIENT
Start: 2022-05-31 | End: 2022-05-31 | Stop reason: HOSPADM

## 2022-05-31 RX ORDER — FENTANYL CITRATE 50 UG/ML
INJECTION, SOLUTION INTRAMUSCULAR; INTRAVENOUS AS NEEDED
Status: DISCONTINUED | OUTPATIENT
Start: 2022-05-31 | End: 2022-05-31 | Stop reason: HOSPADM

## 2022-05-31 RX ORDER — SODIUM CHLORIDE 0.9 % (FLUSH) 0.9 %
5-40 SYRINGE (ML) INJECTION AS NEEDED
Status: CANCELLED | OUTPATIENT
Start: 2022-05-31

## 2022-05-31 RX ORDER — METOPROLOL SUCCINATE 25 MG/1
25 TABLET, EXTENDED RELEASE ORAL DAILY
Status: DISCONTINUED | OUTPATIENT
Start: 2022-06-01 | End: 2022-05-31

## 2022-05-31 RX ORDER — SODIUM CHLORIDE 0.9 % (FLUSH) 0.9 %
5-40 SYRINGE (ML) INJECTION EVERY 8 HOURS
Status: CANCELLED | OUTPATIENT
Start: 2022-05-31

## 2022-05-31 RX ORDER — RANOLAZINE 500 MG/1
500 TABLET, EXTENDED RELEASE ORAL 2 TIMES DAILY
Status: DISCONTINUED | OUTPATIENT
Start: 2022-05-31 | End: 2022-05-31 | Stop reason: HOSPADM

## 2022-05-31 RX ORDER — LIDOCAINE HYDROCHLORIDE 10 MG/ML
INJECTION INFILTRATION; PERINEURAL AS NEEDED
Status: DISCONTINUED | OUTPATIENT
Start: 2022-05-31 | End: 2022-05-31 | Stop reason: HOSPADM

## 2022-05-31 RX ADMIN — FAMOTIDINE 20 MG: 20 TABLET, FILM COATED ORAL at 08:39

## 2022-05-31 RX ADMIN — LOSARTAN POTASSIUM 50 MG: 50 TABLET, FILM COATED ORAL at 08:39

## 2022-05-31 RX ADMIN — ASPIRIN 81 MG: 81 TABLET, COATED ORAL at 10:09

## 2022-05-31 RX ADMIN — OMEGA-3-ACID ETHYL ESTERS 1000 MG: 1 CAPSULE, LIQUID FILLED ORAL at 08:39

## 2022-05-31 RX ADMIN — METOPROLOL TARTRATE 12.5 MG: 25 TABLET, FILM COATED ORAL at 08:39

## 2022-05-31 NOTE — PROGRESS NOTES
Comprehensive Nutrition Assessment    Type and Reason for Visit: Initial,Positive nutrition screen    Nutrition Recommendations/Plan:   1. Continue current nutrition interventions. Malnutrition Assessment:  Malnutrition Status:  Insufficient data (05/31/22 1419)      Nutrition History and Allergies:   Past medical history of HTN, CAD, and type 2 diabetes mellitus is admitted with an abnormal exercise stress test and unstable angina. Weight history per chart review: 171 lb (3/5/15), 164 lb (3/6/15), 164 lb (5/31/22). NKFA. Nutrition Assessment:    Patient with good meal intake and NPO for cardiac cath today. Coronary angiogram revealed 100% RCA with left to right collaterals and ostial left main 50-60% stenosis. Nutrition Related Findings:    Last BM 5/30. Pertinent Medications: NS at 75 mL/hr, famotidine, SSI, losartan, lovaza Wound Type: None    Current Nutrition Intake & Therapies:  Average Meal Intake: NPO  Average Supplement Intake: NPO  DIET NPO Sips of Water with Meds    Anthropometric Measures:  Height: 5' 9\" (175.3 cm)  Ideal Body Weight (IBW): 160 lbs (73 kg)  Admission Body Weight: 164 lb 0.4 oz  Current Body Wt:  74.4 kg (164 lb 0.4 oz), 102.5 % IBW.  Standing scale  Current BMI (kg/m2): 24.2  Usual Body Weight: 74.4 kg (164 lb 0.4 oz)  % Weight Change (Calculated): 0  Weight Adjustment: No adjustment  BMI Category: Normal weight (BMI 22.0-24.9) age over 72    Estimated Daily Nutrient Needs:  Energy Requirements Based On: Formula  Weight Used for Energy Requirements: Current  Energy (kcal/day): 8590-4087  Weight Used for Protein Requirements: Current  Protein (g/day): 59-74  Method Used for Fluid Requirements: 1 ml/kcal  Fluid (ml/day): 3782-0334    Nutrition Diagnosis:   No nutrition diagnosis at this time     Nutrition Interventions:   Food and/or Nutrient Delivery: Start oral diet  Nutrition Education/Counseling: No recommendations at this time,Education not indicated  Coordination of Nutrition Care: Continue to monitor while inpatient    Goals:  Goals: Meet at least 75% of estimated needs,by next RD assessment    Nutrition Monitoring and Evaluation:   Behavioral-Environmental Outcomes: None identified  Food/Nutrient Intake Outcomes: Food and nutrient intake  Physical Signs/Symptoms Outcomes: Meal time behavior    Discharge Planning:    Continue current diet    Sagar Pierce, [address] [place] [address], [address]: [phone]

## 2022-05-31 NOTE — ACP (ADVANCE CARE PLANNING)
Advance Care Planning   Advance Care Planning Inpatient Note  [facility] Department    Today's Date: 5/31/2022  Unit: THE [facility] UNIT    Received request from rounding. Upon review of chart and communication with care team, patient's decision making abilities are not in question. Patient was/were present in the room during visit. Goals of ACP Conversation:  Discuss Advance Care planning documents    Health Care Decision Makers:      Primary Decision Maker: Mery Freida - [phone]      Summary:  No Decision Maker named by patient at this time    Advance Care Planning Documents (Patient Wishes) on file:  Patient stated he in not interested in comleting an ACP at this time.      Interventions:  Deferred conversation as patient not interested in completing an advance directive at this time    Care Preferences Communicated:  No    Outcomes/Plan:  ACP Discussion Refused    Chaplain Tee on 5/31/2022 at 1:11 PM

## 2022-05-31 NOTE — PROGRESS NOTES
Problem: Patient Education: Go to Patient Education Activity  Goal: Patient/Family Education  Outcome: Progressing Towards Goal     Problem: Unstable angina/NSTEMI: Day of Admission/Day 1  Goal: Off Pathway (Use only if patient is Off Pathway)  Outcome: Progressing Towards Goal  Goal: Activity/Safety  Outcome: Progressing Towards Goal  Goal: Consults, if ordered  Outcome: Progressing Towards Goal  Goal: Diagnostic Test/Procedures  Outcome: Progressing Towards Goal  Goal: Nutrition/Diet  Outcome: Progressing Towards Goal  Goal: Discharge Planning  Outcome: Progressing Towards Goal  Goal: Medications  Outcome: Progressing Towards Goal  Goal: Respiratory  Outcome: Progressing Towards Goal  Goal: Treatments/Interventions/Procedures  Outcome: Progressing Towards Goal  Goal: Psychosocial  Outcome: Progressing Towards Goal  Goal: *Hemodynamically stable  Outcome: Progressing Towards Goal  Goal: *Optimal pain control at patient's stated goal  Outcome: Progressing Towards Goal  Goal: *Lungs clear or at baseline  Outcome: Progressing Towards Goal     Problem: Unstable angina/NSTEMI: Day 2  Goal: Off Pathway (Use only if patient is Off Pathway)  Outcome: Progressing Towards Goal  Goal: Activity/Safety  Outcome: Progressing Towards Goal  Goal: Consults, if ordered  Outcome: Progressing Towards Goal  Goal: Diagnostic Test/Procedures  Outcome: Progressing Towards Goal  Goal: Nutrition/Diet  Outcome: Progressing Towards Goal  Goal: Discharge Planning  Outcome: Progressing Towards Goal  Goal: Medications  Outcome: Progressing Towards Goal  Goal: Respiratory  Outcome: Progressing Towards Goal  Goal: Treatments/Interventions/Procedures  Outcome: Progressing Towards Goal  Goal: Psychosocial  Outcome: Progressing Towards Goal  Goal: *Hemodynamically stable  Outcome: Progressing Towards Goal  Goal: *Optimal pain control at patient's stated goal  Outcome: Progressing Towards Goal  Goal: *Lungs clear or at baseline  Outcome: Progressing Towards Goal     Problem: Unstable Angina/NSTEMI: Discharge Outcomes  Goal: *Hemodynamically stable  Outcome: Progressing Towards Goal  Goal: *Stable cardiac rhythm  Outcome: Progressing Towards Goal  Goal: *Lungs clear or at baseline  Outcome: Progressing Towards Goal  Goal: *Optimal pain control at patient's stated goal  Outcome: Progressing Towards Goal  Goal: *Identifies cardiac risk factors  Outcome: Progressing Towards Goal  Goal: *Verbalizes home exercise program, activity guidelines, cardiac precautions  Outcome: Progressing Towards Goal  Goal: *Verbalizes understanding and describes prescribed diet  Outcome: Progressing Towards Goal  Goal: *Verbalizes name, dosage, time, side effects, and number of days to continue medications  Outcome: Progressing Towards Goal  Goal: *Anxiety reduced or absent  Outcome: Progressing Towards Goal  Goal: *Understands and describes signs and symptoms to report to providers(Stroke Metric)  Outcome: Progressing Towards Goal  Goal: *Describes follow-up/return visits to physicians  Outcome: Progressing Towards Goal  Goal: *Describes available resources and support systems  Outcome: Progressing Towards Goal  Goal: *Influenza immunization  Outcome: Progressing Towards Goal  Goal: *Pneumococcal immunization  Outcome: Progressing Towards Goal  Goal: *Describes smoking cessation resources  Outcome: Progressing Towards Goal     Problem: Falls - Risk of  Goal: *Absence of Falls  Description: Document Gail Fall Risk and appropriate interventions in the flowsheet.   Outcome: Progressing Towards Goal  Note: Fall Risk Interventions:            Medication Interventions: Teach patient to arise slowly,Patient to call before getting OOB                   Problem: Patient Education: Go to Patient Education Activity  Goal: Patient/Family Education  Outcome: Progressing Towards Goal

## 2022-05-31 NOTE — PROGRESS NOTES
Bedside and Verbal shift change report given to Ramos Kruger RN (oncoming nurse) by Vida Bernal RN (offgoing nurse). Report included the following information SBAR, Kardex, ED Summary, Intake/Output, MAR, Recent Results, Med Rec Status and Cardiac Rhythm NSR.       1947  Shift assessment complete  Pt resting quietly with eyes open and chest rising and falling evenly   No c/o pain or signs of distress  Bed locked and in lowest position   Call light within [address] Cardiac/Medical Night Shift Chart Audit    Chart Audit completed? YES      Bedside and Verbal shift change report given to Vida Bernal RN (oncoming nurse) by Ramos Kruger RN (offgoing nurse). Report included the following information SBAR, Kardex, ED Summary, Intake/Output, MAR, Recent Results, Med Rec Status and Cardiac Rhythm NSR.

## 2022-05-31 NOTE — PROGRESS NOTES
conducted an initial consultation and Spiritual Assessment for Anuj Mendoza, who is a 77 y. o.,male. Patients Primary Language is: [place]. According to the patients EMR Voodoo Affiliation is: No Mosque. The reason the Patient came to the hospital is:   Patient Active Problem List    Diagnosis Date Noted    Unstable angina ([facility] 05/27/2022    HTN (hypertension) 05/27/2022    Abnormal ECG during exercise stress test 05/27/2022    Type 2 diabetes mellitus ([facility] 05/27/2022    CAD (coronary artery disease) 05/27/2022    Family history of colon cancer 03/06/2015        The  provided the following Interventions:  Initiated a relationship of care and support. Listened empathically. Addressed ACP with patient who stated he in not interested at this time. Provided information about Spiritual Care Services. Offered assurance of prayer on patient's behalf. Chart reviewed. The following outcomes where achieved:   confirmed Patient's Voodoo Affiliation. Patient expressed gratitude for 's visit. Assessment:  Patient does not have any Zoroastrianism/cultural needs that will affect patients preferences in health care. Plan:  Chaplains will continue to follow and will provide pastoral care on an as needed/requested basis.  recommends bedside caregivers page  on duty if patient shows signs of acute spiritual or emotional distress. Rev.  Graeme Kee, Certified Respecting [address] [place] [address] Consultant  [facility]  [phone]

## 2022-05-31 NOTE — ROUTINE PROCESS
TRANSFER - OUT REPORT:    Verbal report given to [address] J[address] Reyes RN(name) on Karan Harvey  being transferred to [address] [place] [address] (unit) for routine progression of care       Report consisted of patients Situation, Background, Assessment and   Recommendations(SBAR). Information from the following report(s) SBAR, Kardex, Procedure Summary, Intake/Output, MAR, Recent Results and Cardiac Rhythm SB was reviewed with the receiving nurse. Lines:   Peripheral IV 05/27/22 Left Antecubital (Active)   Site Assessment Clean, dry, & intact 05/31/22 1105   Phlebitis Assessment 0 05/31/22 1105   Infiltration Assessment 0 05/31/22 1105   Dressing Status Clean, dry, & intact 05/31/22 1105   Dressing Type Transparent;Tape 05/31/22 1105   Hub Color/Line Status Flushed; Infusing;Capped 05/31/22 1105   Action Taken Open ports on tubing capped 05/31/22 1105   Alcohol Cap Used Yes 05/31/22 1105        Opportunity for questions and clarification was provided.       Patient transported with:   Monitor  Registered Nurse  Tech

## 2022-05-31 NOTE — WOUND CARE
IP WOUND CONSULT    [address] RECORD NUMBER:  [id number]  AGE: 77 y.o. GENDER: male  : 1945  TODAY'S DATE:  2022    GENERAL     [] Follow-up   [x] New Consult    Libia Jimenez is a 77 y.o. male referred by:   [] Physician  [x] Nursing  [] Other:         PAST MEDICAL HISTORY    Past Medical History:   Diagnosis Date    Arrhythmia     Chronic kidney disease     stones    Diabetes ([address] 2012    Hypertension 2014    Sleep apnea     Tinnitus of both ears         PAST SURGICAL HISTORY    Past Surgical History:   Procedure Laterality Date    HX CATARACT REMOVAL Bilateral 2015    HX HEART CATHETERIZATION      1999    HX ORTHOPAEDIC Left 1968    amputation thumb    HX UROLOGICAL  14    lithotripsy       FAMILY HISTORY    Family History   Problem Relation Age of Onset    Diabetes Mother     Heart Disease Mother     Diabetes Father     Cancer Sister        SOCIAL HISTORY    Social History     Tobacco Use    Smoking status: Former Smoker     Quit date: 1972     Years since quittin.4    Smokeless tobacco: Never Used   Vaping Use    Vaping Use: Never used   Substance Use Topics    Alcohol use: Yes     Alcohol/week: 4.0 - 5.0 standard drinks     Types: 2 - 3 Glasses of wine, 2 Standard drinks or equivalent per week     Comment: 2-3 times week    Drug use: No       ALLERGIES    No Known Allergies    MEDICATIONS    No current facility-administered medications on file prior to encounter. Current Outpatient Medications on File Prior to Encounter   Medication Sig Dispense Refill    omega-3 acid ethyl esters (LOVAZA) 1 gram capsule Take 2 g by mouth two (2) times a day.  metoprolol tartrate (LOPRESSOR) 25 mg tablet Take 25 mg by mouth once over twenty-four (24) hours.  loratadine (CLARITIN) 10 mg tablet Take 10 mg by mouth daily.  losartan (COZAAR) 50 mg tablet Take 50 mg by mouth daily.       atorvastatin (LIPITOR) 80 mg tablet Take 80 mg by mouth nightly. Indications: HYPERCHOLESTEROLEMIA      Aspirin, Buffered 81 mg tab Take  by mouth nightly.  metFORMIN (GLUCOPHAGE) 1,000 mg tablet Take 500 mg by mouth daily (with dinner).  Indications: TYPE 2 DIABETES MELLITUS (Patient not taking: Reported on 5/27/2022)         Wt Readings from Last 3 Encounters:   05/31/22 74.4 kg (164 lb)   03/06/15 74.4 kg (164 lb)   03/05/15 77.6 kg (171 lb)       [unfilled]  Visit Vitals  BP (!) 135/54 (BP Patient Position: Sitting)   Pulse (!) 53   Temp 97.7 °F (36.5 °C)   Resp 18   Ht 5' 9\" (1.753 m)   Wt 74.4 kg (164 lb)   SpO2 97%   BMI 24.22 kg/m²       ASSESSMENT     Skin impairment Identification:  Type: rash    Contributing Factors: none    Wound Chest Left;Right adhesive irritation from EKG stickers (Active)   Wound Image     05/31/22 1526   Wound Etiology Other (Comment) 05/31/22 1526   Cleansed Wound cleanser 05/31/22 1526   Dressing/Treatment Open to air 05/31/22 1526   Wound Assessment Pink/red 05/31/22 1526   Drainage Amount None 05/31/22 1526   Wound Odor None 05/31/22 1526   Zoe-Wound/Incision Assessment Intact 05/31/22 1526   Wound Thickness Description Partial thickness 05/31/22 1526   Number of days: 0          PLAN     Skin Care & Pressure Relief Recommendations  Minimize layers of linen  Pads under patient to optimize support surface  Turn/reposition approximately every 2 hours  Promote continence; Skin Protective lotion/cream to buttocks and sacrum daily and as needed with incontinence care    Recommendations: rotate lead sites frequently    Teaching completed with:   [x] Patient           [] Family member       [] Caregiver          [x] Nursing  [] Other    Patient/Caregiver Teaching:  Level of patient/caregiver understanding able to:   [x] Indicates understanding       [] Needs reinforcement  [] Unsuccessful      [] Verbal Understanding  [] Demonstrated understanding       [] No evidence of learning  [] Refused teaching         [] N/A Electronically signed by Holley Mathis RN on 5/31/2022 at 3:29 PM

## 2022-05-31 NOTE — PROGRESS NOTES
Bedside and Verbal shift change report given to Maren Streeter RN   (oncoming nurse) by Binu Montalvo  (offgoing nurse). Report included the following information SBAR, Intake/Output and MAR. Approx 0900- ptt off jo ann to be prepped for [facility] Cath.      Pt returned to room with Cath nurse  immobilizer

## 2022-05-31 NOTE — DISCHARGE INSTRUCTIONS
Cardiac Catheterization/Angiography Discharge Instructions    *Check the puncture site frequently for swelling or bleeding. If you see any bleeding, lie down and apply pressure over the area with a clean town or washcloth. Notify your doctor for any redness, swelling, drainage or oozing from the puncture site. Notify your doctor for any fever or chills. *If the leg or arm with the puncture becomes cold, numb or painful, call Dr Nicole Ormond    *Activity should be limited for the next 48 hours. Climb stairs as little as possible and avoid any stooping, bending or strenuous activity for 48 hours. No heavy lifting (anything over 10 pounds) for three days. *Do not drive for 48 hours. *You may resume your usual diet. Drink more fluids than usual.    *Have a responsible person drive you home and stay with you for at least 24 hours after your heart catheterization/angiography. *You may remove the bandage from your Left and Arm in 24 hours. You may shower in 24 hours. No tub baths, hot tubs or swimming for one week. Do not place any lotions, creams, powders, ointments over the puncture site for one week. You may place a clean band-aid over the puncture site each day for 5 days. Change this daily. No driving for 24 hours per   No weight lifting no more than 10 lb from left hand for 1 week. No strenuous exercise or strenuous activity for now      DISCHARGE SUMMARY from Nurse    PATIENT INSTRUCTIONS:    After general anesthesia or intravenous sedation, for 24 hours or while taking prescription Narcotics:  · Limit your activities  · Do not drive and operate hazardous machinery  · Do not make important personal or business decisions  · Do  not drink alcoholic beverages  · If you have not urinated within 8 hours after discharge, please contact your surgeon on call.     Report the following to your surgeon:  · Excessive pain, swelling, redness or odor of or around the surgical area  · Temperature over 100.5  · Nausea and vomiting lasting longer than 4 hours or if unable to take medications  · Any signs of decreased circulation or nerve impairment to extremity: change in color, persistent  numbness, tingling, coldness or increase pain  · Any questions    What to do at Home:  Recommended activity: {discharge activity:62408}, ***    If you experience any of the following symptoms ***, please follow up with ***. *  Please give a list of your current medications to your Primary Care Provider. *  Please update this list whenever your medications are discontinued, doses are      changed, or new medications (including over-the-counter products) are added. *  Please carry medication information at all times in case of emergency situations. These are general instructions for a healthy lifestyle:    No smoking/ No tobacco products/ Avoid exposure to second hand smoke  Surgeon General's Warning:  Quitting smoking now greatly reduces serious risk to your health. Obesity, smoking, and sedentary lifestyle greatly increases your risk for illness    A healthy diet, regular physical exercise & weight monitoring are important for maintaining a healthy lifestyle    You may be retaining fluid if you have a history of heart failure or if you experience any of the following symptoms:  Weight gain of 3 pounds or more overnight or 5 pounds in a week, increased swelling in our hands or feet or shortness of breath while lying flat in bed. Please call your doctor as soon as you notice any of these symptoms; do not wait until your next office visit. The discharge information has been reviewed with the {PATIENT PARENT GUARDIAN:02742}. The {PATIENT PARENT GUARDIAN:78116} verbalized understanding.   Discharge medications reviewed with the {Dishcarge meds reviewed UYPL:45909} and appropriate educational materials and side effects teaching were provided.   ___________________________________________________________________________________________________________________________________

## 2022-05-31 NOTE — PROGRESS NOTES
Pt is all prepped and ready for procedure. Daniel Askew 79 Pt is picked up for procedure     1305 Pt returned to unit S/P LHC. Pt awake and alert and tolerated procedure well. Tr band to left wrist with immobilizer in place. Site WNL. Precautions reinforced. 1425 Tr band removed and tolerated well. No bleeding nor hematoma noted to site. Sterile 2x2 with tegaderm dressing applied to site. Education provided and understandings verbalized. [address] Wound care nurse seeing patient for skin/ reddened &  Abrasions caused by tele leads staying too long to skin. Nurse made aware. 1500 Pt taken back to room in stable condition with wife in room.

## 2022-05-31 NOTE — PROGRESS NOTES
Hospitalist Progress Note    Patient: Juan Perales MRN: [id number]  CSN: [id number]    YOB: 1945  Age: 77 y.o. Sex: male    DOA: 5/27/2022 LOS:  LOS: 4 days          Chief Complaint:    Chest pain      Assessment/Plan     1. Unstable angina  2. HTN  3. DM2     lipitor, asa, metoprolol, tx dose lovenox  monitor glucose  cath today    Further plans to be determined after procedure completed     Disposition :  Patient Active Problem List   Diagnosis Code    Family history of colon cancer Z80.0    Unstable angina ([address] I20.0    HTN (hypertension) I10    Abnormal ECG during exercise stress test R94.31    Type 2 diabetes mellitus (HCC) E11.9    CAD (coronary artery disease) I25.10       Subjective:    I feel fine, when is my procedure? ? Denies CP, SOB, palp, HA    Review of systems:    Constitutional: denies fevers, chills, myalgias    Gastrointestinal: denies nausea      Vital signs/Intake and Output:  Visit Vitals  BP (!) 127/51   Pulse 61   Temp 97.5 °F (36.4 °C)   Resp 16   Wt 74.4 kg (164 lb)   SpO2 99%   BMI 24.22 kg/m²     Current Shift:  No intake/output data recorded. Last three shifts:  No intake/output data recorded.     Exam:    General: Well developed, alert, NAD, OX3  Up walking  CVS:Regular rate and rhythm, no M/R/G, S1/S2 heard, no thrill  Lungs:Clear to auscultation bilaterally, no wheezes, rhonchi, or rales  Extremities: No C/C/E, pulses palpable 2+  Neuro:grossly normal , follows commands  Psych:appropriate                Labs: Results:       Chemistry Recent Labs     05/31/22  0335 05/30/22  0320 05/29/22  0210   * 110* 118*    140 140   K 3.6 3.7 3.8    106 106   CO2 30 30 30   BUN 23* 23* 21*   CREA 0.88 0.97 1.03   CA 9.1 9.1 9.2   AGAP 4 4 4   BUCR 26* 24* 20   AP 59 60 64   TP 6.7 6.7 6.8   ALB 3.1* 3.0* 3.0*   GLOB 3.6 3.7 3.8   AGRAT 0.9 0.8 0.8      CBC w/Diff Recent Labs     05/31/22  0335 05/30/22  0320 05/29/22  0210   WBC 7.9 8.0 8.3   RBC 4.30* 4.36 4.33*   HGB 13.3 13.7 13.7   HCT 39.4 39.9 40.9    167 171   GRANS 60  --   --    LYMPH 23  --   --    EOS 5  --   --       Cardiac Enzymes No results for input(s): CPK, CKND1, LUCINDA in the last 72 hours. No lab exists for component: CKRMB, TROIP   Coagulation Recent Labs     05/31/22 0335   PTP 17.8*   INR 1.4*       Lipid Panel No results found for: CHOL, CHOLPOCT, CHOLX, CHLST, CHOLV, 941642, HDL, HDLP, LDL, LDLC, DLDLP, 293239, VLDLC, VLDL, TGLX, TRIGL, TRIGP, TGLPOCT, CHHD, CHHDX   BNP No results for input(s): BNPP in the last 72 hours.    Liver Enzymes Recent Labs     05/31/22 0335   TP 6.7   ALB 3.1*   AP 59      Thyroid Studies No results found for: T4, T3U, TSH, TSHEXT     Procedures/imaging: see electronic medical records for all procedures/Xrays and details which were not copied into this note but were reviewed prior to creation of Keegan Whipple MD

## 2022-05-31 NOTE — DISCHARGE SUMMARY
[address]    Name:  Allyson Perez  MR#:   [id number]  :  1945  ACCOUNT #:  [de-identified]  ADMIT DATE:  2022  DISCHARGE DATE:  2022      The patient had a cardiac cath procedure. Dr. Prabha Weaver, Cardiology consulting. DISCHARGE DIAGNOSES:  1. Critical coronary artery disease, but stable. 2.  Hypertension. 3.  Type 2 diabetes mellitus. 4.  Known coronary artery disease. HOSPITAL SUMMARY:  This is a 76-year-old male with hypertension, coronary artery disease, and type 2 diabetes. He was admitted because of an abnormal exercise stress test as well as unstable angina with shortness of breath and dyspnea over the last few weeks. The patient was admitted to the hospital after failing his outpatient exercise nuclear stress test and subsequently was admitted for serial cardiac enzymes and monitoring to proceed with cardiac catheterization. Over the last few days, he has been stable. His cardiac markers are normal.  He has no further angina. His labs show a normal CBC, normal chemistry profile, creatinine 0.88. He has had no evidence for acute coronary syndrome. He underwent cardiac cath today which he tolerated well, but unfortunately it showed notable coronary artery disease that will require Cardiothoracic Surgery evaluation in [place]. He has 100% RCA with left-to-right collaterals and ostial left main 50-60% stenosis. The patient was seen today. On exam, he was up and ambulatory in the hallway prior to his procedure. I have talked to him, since his procedure he is doing well with no chest pain, shortness of breath, nausea, palpitations, or headache. The patient can discharge this evening as has been cleared by Cardiology. He is going to see the Cardiothoracic Surgery Office in [place] on Thursday. He will continue a low-sodium diet at home. DISCHARGE MEDICATIONS:  He will continue the following medications:  1. Aspirin 81 mg daily.   2.  Lipitor 80 mg at night. 3.  Claritin 10 mg daily as needed. 4.  Cozaar 50 mg daily. 5.  Metoprolol 12.5 mg twice daily. 6.  Lovaza 2 g twice daily. 7.  Ranexa 500 mg twice daily. The patient has no questions about his plan. Cardiology is going to review. His followup is Thursday with him and were to go to see the CT surgeon. The patient is stable at this point for discharge to home this evening and Cardiology has recommended he be discharged.       Amarjit Jeffers MD      [id number]  D:  05/31/2022 17:16  T:  05/31/2022 19:31  JOB #:  [id number]

## 2022-05-31 NOTE — H&P
Date of Surgery Update: Adam Hammond was seen and examined. History and physical has been reviewed. The patient has been examined. There have been no significant clinical changes since the completion of the originally dated History and Physical.      Patient assessed and is candidate for moderate sedation.       Signed By: Katelynn Bardales MD     May 31, 2022 12:35 PM

## 2022-05-31 NOTE — PROCEDURES
[facility] and Coronary angiogram done via left radial approach. Coronary angiogram revealed 100% RCA with left to right collaterals and ostial left main 50-60% stenosis. LV gram was not done. LVEDP 4-6  mm hg. No significant gradient on pull back. Patient tolerated procedure well. Scant blood loss. No complications. No specimen removed. Recommendation:    Medication considered:   Aspirin, beta blocker, ACEI, Nitrates and statin     CAD risk factor education  Diet education  Control cholesterol  Blood pressure control  Exercise education: Age and functional status appropriate. CT surgery referral  for CABG evaluation   Plan discussed with patient and family member    Case discussed with CT surgeon Kathia Gonsalez  He will see patient as an outpatient in few days. Okay to discharge from cardiac standpoint. Advised to continue aspirin, metoprolol and statin. Add Ranexa 500 mg by mouth twice a day. No driving for 24 hours per   No weight lifting no more than 10 lb from left hand for 1 week. No strenuous exercise or strenuous activity for now. Patient will see CT surgeon Kathia Gonsalez as an outpatient for CABG evaluation  Plan discussed with      Patient's condition and plan discussed with patient's wife at bedside.

## 2022-05-31 NOTE — PROGRESS NOTES
D/c plan: home pending medical progression. Spouse to transport. Anticipate d/c home w/ spouse as pt is independent in all ADLs and IDLs. CM to continue to follow. Care Management Interventions  PCP Verified by CM: Yes  Mode of Transport at Discharge: Other (see comment) (Spouse to transport)  Transition of Care Consult (CM Consult):  Other (home w/ family support)  Discharge Durable Medical Equipment: No  Support Systems: Spouse/Significant Other  Confirm Follow Up Transport: Family  The Plan for Transition of Care is Related to the Following Treatment Goals : home  Freedom of Choice List was Provided with Basic Dialogue that Supports the Patient's Individualized Plan of Care/Goals, Treatment Preferences and Shares the Quality Data Associated with the Providers?:  (n/a)  Discharge Location  Patient Expects to be Discharged to[de-identified] Home

## 2022-05-31 NOTE — PROGRESS NOTES
Cardiology Progress Note        Patient: Bran Salomon        Sex: male          DOA: 5/27/2022  YOB: 1945      Age:  77 y.o.        LOS:  LOS: 4 days    Patient seen and examined, chart reviewed. Assessment/Plan     Patient Active Problem List   Diagnosis Code    Family history of colon cancer Z80.0    Unstable angina ([facility] [address] I20.0    HTN (hypertension) I10    Abnormal ECG during exercise stress test R94.31    Type 2 diabetes mellitus (HCC) E11.9    CAD (coronary artery disease) I25.10      CAD with unstable angina   Abnormal exercise nuclear stress test     He exercised for 10 minutes on treadmill with Timothy protocol and an of significant shortness of breath. EKG revealed ST elevation in inferior leads during exercise which resolved completely during recovery and patient was asymptomatic. Nuclear images revealed fixed inferior perfusion defect. Echocardiogram was done in my office revealed no wall motion abnormality and normal LVEF. Plan:    Continue aspirin, Metoprolol and statin  Lovenox on hold this AM  Advised about LHC, coronary angiogram +/- PCI. All risks, benefits and alternatives explained to patient and he verbally understood. Discussed with patient about risk of cardiac catheterization including not limited are hematoma, retroperitoneal bleed, pseudoaneurysm, AV fistula, dissection, thrombosis and embolism, radial artery occlusion, myocardial infarction, CVA, TIA, dissection and perforation of great vessels, cardiac perforation, tamponade, atheroembolism, allergy reaction, infection, acute renal failure, arrhythmias, radiation injury, congestive heart failure, respiratory failure, multiorgan failure, death. Patient agreed for procedure. Further recommendation to follow after cardiac catheterization              Subjective:    cc:  Denies any chest pain      REVIEW OF SYSTEMS:     General: No fevers or chills.   Cardiovascular: Positive shortness of breath on exertion, No chest pain,No palpitations, No orthopnea, No PND, No leg swelling, No claudication  Pulmonary: No dysnea. Gastrointestinal: No nausea, vomiting, bleeding  Neurology: No Dizziness    Objective:      Visit Vitals  /69 (BP 1 Location: Right upper arm, BP Patient Position: At rest)   Pulse (!) 58   Temp 97.7 °F (36.5 °C)   Resp 20   Ht 5' 9\" (1.753 m)   Wt 74.4 kg (164 lb)   SpO2 96%   BMI 24.22 kg/m²     Body mass index is 24.22 kg/m². Physical Exam:  General Appearance: Comfortable, not using accessory muscles of respiration. HEENT: GABBY. HEAD: Atraumatic  NECK: No JVD, no thyroidomeglay. CAROTIDS: No bruit  LUNGS: Clear bilaterally. HEART: S1+S2 audible, no murmur, no pericardial rub. ABD: Non-tender, BS Audible    EXT: No edema, and no cyanosis. VASCULAR EXAM: Pulses are intact. PSYCHIATRIC EXAM: Mood is appropriate. MUSCULOSKELETAL: Grossly no joint deformity.   NEUROLOGICAL: AAO times 3, No motor and sensory deficit    Medication:  Current Facility-Administered Medications   Medication Dose Route Frequency    metoprolol tartrate (LOPRESSOR) tablet 12.5 mg  12.5 mg Oral Q12H    0.9% sodium chloride infusion  75 mL/hr IntraVENous CONTINUOUS    aspirin delayed-release tablet 81 mg  81 mg Oral DAILY    sodium chloride (NS) flush 5-40 mL  5-40 mL IntraVENous Q8H    sodium chloride (NS) flush 5-40 mL  5-40 mL IntraVENous PRN    acetaminophen (TYLENOL) tablet 650 mg  650 mg Oral Q6H PRN    Or    acetaminophen (TYLENOL) suppository 650 mg  650 mg Rectal Q6H PRN    polyethylene glycol (MIRALAX) packet 17 g  17 g Oral DAILY PRN    ondansetron (ZOFRAN ODT) tablet 4 mg  4 mg Oral Q8H PRN    Or    ondansetron (ZOFRAN) injection 4 mg  4 mg IntraVENous Q6H PRN    famotidine (PEPCID) tablet 20 mg  20 mg Oral BID    atorvastatin (LIPITOR) tablet 80 mg  80 mg Oral QHS    losartan (COZAAR) tablet 50 mg  50 mg Oral DAILY    insulin lispro (HUMALOG) injection SubCUTAneous AC&HS    glucose chewable tablet 16 g  16 g Oral PRN    glucagon (GLUCAGEN) injection 1 mg  1 mg IntraMUSCular PRN    dextrose 10% infusion 0-250 mL  0-250 mL IntraVENous PRN    omega-3 acid ethyl esters (LOVAZA) capsule 1,000 mg  1 g Oral BID WITH MEALS               Lab/Data Reviewed:       Recent Labs     05/31/22  0335 05/30/22  0320 05/29/22  0210   WBC 7.9 8.0 8.3   HGB 13.3 13.7 13.7   HCT 39.4 39.9 40.9    167 171     Recent Labs     05/31/22  0335 05/30/22  0320 05/29/22  0210    140 140   K 3.6 3.7 3.8    106 106   CO2 30 30 30   * 110* 118*   BUN 23* 23* 21*   CREA 0.88 0.97 1.03   CA 9.1 9.1 9.2       Signed By: Katerine Valdes MD     May 31, 2022

## 2023-06-09 ENCOUNTER — HOSPITAL ENCOUNTER (OUTPATIENT)
Facility: HOSPITAL | Age: 78
End: 2023-06-09
Payer: MEDICARE

## 2023-06-09 LAB
BASOPHILS # BLD: 0 K/UL (ref 0–0.1)
BASOPHILS NFR BLD: 0 % (ref 0–2)
DIFFERENTIAL METHOD BLD: ABNORMAL
EOSINOPHIL # BLD: 0.3 K/UL (ref 0–0.4)
EOSINOPHIL NFR BLD: 3 % (ref 0–5)
ERYTHROCYTE [DISTWIDTH] IN BLOOD BY AUTOMATED COUNT: 15 % (ref 11.6–14.5)
HCT VFR BLD AUTO: 42.9 % (ref 36–48)
HGB BLD-MCNC: 13.9 G/DL (ref 13–16)
IMM GRANULOCYTES # BLD AUTO: 0 K/UL (ref 0–0.04)
IMM GRANULOCYTES NFR BLD AUTO: 0 % (ref 0–0.5)
LYMPHOCYTES # BLD: 1.8 K/UL (ref 0.9–3.6)
LYMPHOCYTES NFR BLD: 20 % (ref 21–52)
MCH RBC QN AUTO: 30.6 PG (ref 24–34)
MCHC RBC AUTO-ENTMCNC: 32.4 G/DL (ref 31–37)
MCV RBC AUTO: 94.5 FL (ref 78–100)
MONOCYTES # BLD: 1 K/UL (ref 0.05–1.2)
MONOCYTES NFR BLD: 11 % (ref 3–10)
NEUTS SEG # BLD: 6.1 K/UL (ref 1.8–8)
NEUTS SEG NFR BLD: 66 % (ref 40–73)
NRBC # BLD: 0 K/UL (ref 0–0.01)
NRBC BLD-RTO: 0 PER 100 WBC
PLATELET # BLD AUTO: 188 K/UL (ref 135–420)
PMV BLD AUTO: 9.5 FL (ref 9.2–11.8)
RBC # BLD AUTO: 4.54 M/UL (ref 4.35–5.65)
WBC # BLD AUTO: 9.3 K/UL (ref 4.6–13.2)

## 2023-06-09 PROCEDURE — 85025 COMPLETE CBC W/AUTO DIFF WBC: CPT

## 2023-06-09 PROCEDURE — 36415 COLL VENOUS BLD VENIPUNCTURE: CPT

## 2024-06-06 ENCOUNTER — HOSPITAL ENCOUNTER (OUTPATIENT)
Facility: HOSPITAL | Age: 79
Discharge: HOME | End: 2024-06-06
Payer: MEDICARE

## 2024-06-06 ENCOUNTER — TRANSCRIBE ORDERS (OUTPATIENT)
Facility: HOSPITAL | Age: 79
End: 2024-06-06

## 2024-06-06 DIAGNOSIS — I25.10 ATHEROSCLEROSIS OF NATIVE CORONARY ARTERY OF NATIVE HEART WITHOUT ANGINA PECTORIS: ICD-10-CM

## 2024-06-06 DIAGNOSIS — I25.10 ATHEROSCLEROSIS OF NATIVE CORONARY ARTERY OF NATIVE HEART WITHOUT ANGINA PECTORIS: Primary | ICD-10-CM

## 2024-06-06 LAB
ALBUMIN SERPL-MCNC: 3.6 G/DL (ref 3.4–5)
ALBUMIN/GLOB SERPL: 0.9 (ref 0.8–1.7)
ALP SERPL-CCNC: 130 U/L (ref 45–117)
ALT SERPL-CCNC: 16 U/L (ref 16–61)
ANION GAP SERPL CALC-SCNC: 4 MMOL/L (ref 3–18)
AST SERPL-CCNC: 23 U/L (ref 10–38)
BASOPHILS # BLD: 0 K/UL (ref 0–0.1)
BASOPHILS NFR BLD: 0 % (ref 0–2)
BILIRUB SERPL-MCNC: 0.9 MG/DL (ref 0.2–1)
BUN SERPL-MCNC: 12 MG/DL (ref 7–18)
BUN/CREAT SERPL: 12 (ref 12–20)
CALCIUM SERPL-MCNC: 9.3 MG/DL (ref 8.5–10.1)
CHLORIDE SERPL-SCNC: 104 MMOL/L (ref 100–111)
CO2 SERPL-SCNC: 30 MMOL/L (ref 21–32)
CREAT SERPL-MCNC: 1.02 MG/DL (ref 0.6–1.3)
DIFFERENTIAL METHOD BLD: ABNORMAL
EOSINOPHIL # BLD: 0.3 K/UL (ref 0–0.4)
EOSINOPHIL NFR BLD: 3 % (ref 0–5)
ERYTHROCYTE [DISTWIDTH] IN BLOOD BY AUTOMATED COUNT: 14.1 % (ref 11.6–14.5)
GLOBULIN SER CALC-MCNC: 4.2 G/DL (ref 2–4)
GLUCOSE SERPL-MCNC: 115 MG/DL (ref 74–99)
HCT VFR BLD AUTO: 44.5 % (ref 36–48)
HGB BLD-MCNC: 14.4 G/DL (ref 13–16)
IMM GRANULOCYTES # BLD AUTO: 0 K/UL (ref 0–0.04)
IMM GRANULOCYTES NFR BLD AUTO: 0 % (ref 0–0.5)
INR PPP: 1.9 (ref 0.9–1.1)
LYMPHOCYTES # BLD: 1.4 K/UL (ref 0.9–3.6)
LYMPHOCYTES NFR BLD: 13 % (ref 21–52)
MCH RBC QN AUTO: 29.8 PG (ref 24–34)
MCHC RBC AUTO-ENTMCNC: 32.4 G/DL (ref 31–37)
MCV RBC AUTO: 91.9 FL (ref 78–100)
MONOCYTES # BLD: 1 K/UL (ref 0.05–1.2)
MONOCYTES NFR BLD: 9 % (ref 3–10)
NEUTS SEG # BLD: 8 K/UL (ref 1.8–8)
NEUTS SEG NFR BLD: 75 % (ref 40–73)
NRBC # BLD: 0 K/UL (ref 0–0.01)
NRBC BLD-RTO: 0 PER 100 WBC
PLATELET # BLD AUTO: 257 K/UL (ref 135–420)
PMV BLD AUTO: 9.4 FL (ref 9.2–11.8)
POTASSIUM SERPL-SCNC: 4.7 MMOL/L (ref 3.5–5.5)
PROT SERPL-MCNC: 7.8 G/DL (ref 6.4–8.2)
PROTHROMBIN TIME: 21.7 SEC (ref 11.9–14.9)
RBC # BLD AUTO: 4.84 M/UL (ref 4.35–5.65)
SODIUM SERPL-SCNC: 138 MMOL/L (ref 136–145)
WBC # BLD AUTO: 10.7 K/UL (ref 4.6–13.2)

## 2024-06-06 PROCEDURE — 36415 COLL VENOUS BLD VENIPUNCTURE: CPT

## 2024-06-06 PROCEDURE — 85610 PROTHROMBIN TIME: CPT

## 2024-06-06 PROCEDURE — 85025 COMPLETE CBC W/AUTO DIFF WBC: CPT

## 2024-06-06 PROCEDURE — 80053 COMPREHEN METABOLIC PANEL: CPT

## 2024-06-07 ENCOUNTER — TRANSCRIBE ORDERS (OUTPATIENT)
Facility: HOSPITAL | Age: 79
End: 2024-06-07

## 2024-06-07 ENCOUNTER — HOSPITAL ENCOUNTER (OUTPATIENT)
Facility: HOSPITAL | Age: 79
End: 2024-06-07
Payer: MEDICARE

## 2024-06-07 DIAGNOSIS — F17.211 CIGARETTE NICOTINE DEPENDENCE IN REMISSION: ICD-10-CM

## 2024-06-07 DIAGNOSIS — F17.211 CIGARETTE NICOTINE DEPENDENCE IN REMISSION: Primary | ICD-10-CM

## 2024-06-07 DIAGNOSIS — R06.09 DYSPNEA ON EXERTION: ICD-10-CM

## 2024-06-07 DIAGNOSIS — R05.3 CHRONIC COUGH: ICD-10-CM

## 2024-06-07 DIAGNOSIS — J98.4 DISEASE OF LUNG: ICD-10-CM

## 2024-06-07 DIAGNOSIS — R94.2 NONSPECIFIC ABNORMAL RESULTS OF PULMONARY SYSTEM FUNCTION STUDY: ICD-10-CM

## 2024-06-07 DIAGNOSIS — J84.10 POSTINFLAMMATORY PULMONARY FIBROSIS (HCC): ICD-10-CM

## 2024-06-07 DIAGNOSIS — J84.9 TROPICAL PULMONARY ALVEOLITIS (HCC): ICD-10-CM

## 2024-06-07 LAB
CRP SERPL-MCNC: 1.2 MG/DL (ref 0–0.3)
ERYTHROCYTE [SEDIMENTATION RATE] IN BLOOD: 19 MM/HR (ref 0–20)
RHEUMATOID FACT SERPL-ACNC: <10 IU/ML

## 2024-06-07 PROCEDURE — 86200 CCP ANTIBODY: CPT

## 2024-06-07 PROCEDURE — 85652 RBC SED RATE AUTOMATED: CPT

## 2024-06-07 PROCEDURE — 86606 ASPERGILLUS ANTIBODY: CPT

## 2024-06-07 PROCEDURE — 82164 ANGIOTENSIN I ENZYME TEST: CPT

## 2024-06-07 PROCEDURE — 83516 IMMUNOASSAY NONANTIBODY: CPT

## 2024-06-07 PROCEDURE — 86671 FUNGUS NES ANTIBODY: CPT

## 2024-06-07 PROCEDURE — 82085 ASSAY OF ALDOLASE: CPT

## 2024-06-07 PROCEDURE — 36415 COLL VENOUS BLD VENIPUNCTURE: CPT

## 2024-06-07 PROCEDURE — 86431 RHEUMATOID FACTOR QUANT: CPT

## 2024-06-07 PROCEDURE — 86331 IMMUNODIFFUSION OUCHTERLONY: CPT

## 2024-06-07 PROCEDURE — 86037 ANCA TITER EACH ANTIBODY: CPT

## 2024-06-07 PROCEDURE — 86602 ANTINOMYCES ANTIBODY: CPT

## 2024-06-07 PROCEDURE — 86140 C-REACTIVE PROTEIN: CPT

## 2024-06-07 PROCEDURE — 86225 DNA ANTIBODY NATIVE: CPT

## 2024-06-07 PROCEDURE — 82550 ASSAY OF CK (CPK): CPT

## 2024-06-07 PROCEDURE — 86235 NUCLEAR ANTIGEN ANTIBODY: CPT

## 2024-06-07 PROCEDURE — 82552 ASSAY OF CPK IN BLOOD: CPT

## 2024-06-08 LAB
ACE SERPL-CCNC: 25 U/L (ref 14–82)
CENTROMERE B AB SER-ACNC: NORMAL AI
CHROMATIN AB SERPL-ACNC: NORMAL AI
CK BB CFR SERPL ELPH: NORMAL %
CK MACRO1 CFR SERPL: NORMAL %
CK MACRO2 CFR SERPL: NORMAL %
CK MB CFR SERPL ELPH: NORMAL %
CK MM CFR SERPL ELPH: NORMAL %
CK SERPL-CCNC: 69 U/L (ref 41–331)
DSDNA AB SER-ACNC: NORMAL IU/ML
ENA JO1 AB SER-ACNC: NORMAL AI
ENA RNP AB SER-ACNC: NORMAL AI
ENA SCL70 AB SER-ACNC: NORMAL AI
ENA SM AB SER-ACNC: NORMAL AI
ENA SS-A AB SER-ACNC: NORMAL AI
ENA SS-B AB SER-ACNC: NORMAL AI
Lab: NORMAL

## 2024-06-09 LAB — ALDOLASE SERPL-CCNC: 7.5 U/L (ref 3.3–10.3)

## 2024-06-10 ENCOUNTER — HOSPITAL ENCOUNTER (OUTPATIENT)
Facility: HOSPITAL | Age: 79
Discharge: HOME | End: 2024-06-13
Payer: MEDICARE

## 2024-06-10 LAB
CCP IGA+IGG SERPL IA-ACNC: 23 UNITS (ref 0–19)
CENTROMERE B AB SER-ACNC: 0.5 AI (ref 0–0.9)
CHROMATIN AB SERPL-ACNC: <0.2 AI (ref 0–0.9)
DSDNA AB SER-ACNC: <1 IU/ML (ref 0–9)
ENA JO1 AB SER-ACNC: <0.2 AI (ref 0–0.9)
ENA RNP AB SER-ACNC: 4.2 AI (ref 0–0.9)
ENA SCL70 AB SER-ACNC: 0.3 AI (ref 0–0.9)
ENA SM AB SER-ACNC: <0.2 AI (ref 0–0.9)
ENA SS-A AB SER-ACNC: <0.2 AI (ref 0–0.9)
ENA SS-B AB SER-ACNC: <0.2 AI (ref 0–0.9)
Lab: ABNORMAL

## 2024-06-10 PROCEDURE — 86331 IMMUNODIFFUSION OUCHTERLONY: CPT

## 2024-06-10 PROCEDURE — 86038 ANTINUCLEAR ANTIBODIES: CPT

## 2024-06-10 PROCEDURE — 86003 ALLG SPEC IGE CRUDE XTRC EA: CPT

## 2024-06-10 PROCEDURE — 82785 ASSAY OF IGE: CPT

## 2024-06-10 PROCEDURE — 86235 NUCLEAR ANTIGEN ANTIBODY: CPT

## 2024-06-10 PROCEDURE — 86225 DNA ANTIBODY NATIVE: CPT

## 2024-06-10 PROCEDURE — 86160 COMPLEMENT ANTIGEN: CPT

## 2024-06-10 PROCEDURE — 36415 COLL VENOUS BLD VENIPUNCTURE: CPT

## 2024-06-11 LAB
C-ANCA TITR SER IF: NORMAL TITER
MYELOPEROXIDASE AB SER IA-ACNC: <0.2 UNITS (ref 0–0.9)
P-ANCA ATYPICAL TITR SER IF: NORMAL TITER
P-ANCA TITR SER IF: NORMAL TITER
PROTEINASE3 AB SER IA-ACNC: <0.2 UNITS (ref 0–0.9)

## 2024-06-12 LAB
A FUMIGATUS1 AB SER QL ID: NEGATIVE
A PULLULANS AB SER QL: NEGATIVE
DSDNA AB SER-ACNC: <1 IU/ML (ref 0–9)
ENA JO1 AB SER-ACNC: <0.2 AI (ref 0–0.9)
ENA SCL70 AB SER-ACNC: 0.3 AI (ref 0–0.9)
ENA SS-A AB SER-ACNC: <0.2 AI (ref 0–0.9)
ENA SS-B AB SER-ACNC: <0.2 AI (ref 0–0.9)
LACEYELLA SACCHARI AB SER QL: NEGATIVE
PIGEON SERUM AB QL ID: NEGATIVE
S RECTIVIRGULA IGG SER QL ID: NEGATIVE
T VULGARIS AB SER QL ID: NEGATIVE

## 2024-06-14 LAB
CK BB CFR SERPL ELPH: 0 %
CK MACRO1 CFR SERPL: 0 %
CK MACRO2 CFR SERPL: 0 %
CK MB CFR SERPL ELPH: 0 % (ref 0–3)
CK MM CFR SERPL ELPH: 100 % (ref 97–100)
CK SERPL-CCNC: 69 U/L (ref 41–331)

## 2024-06-18 LAB
FAX TO NUMBER: NORMAL
FAX TO NUMBER: NORMAL
TEST RESULTS FAXED TO: NORMAL
TEST RESULTS FAXED TO: NORMAL

## 2024-06-24 RX ORDER — NITROGLYCERIN 0.4 MG/1
0.4 TABLET SUBLINGUAL EVERY 5 MIN PRN
COMMUNITY

## 2024-06-24 RX ORDER — ALBUTEROL SULFATE 90 UG/1
2 AEROSOL, METERED RESPIRATORY (INHALATION) EVERY 6 HOURS PRN
COMMUNITY

## 2024-06-24 RX ORDER — M-VIT,TX,IRON,MINS/CALC/FOLIC 27MG-0.4MG
1 TABLET ORAL DAILY
COMMUNITY

## 2024-06-24 RX ORDER — ASPIRIN 81 MG/1
81 TABLET ORAL DAILY
COMMUNITY

## 2024-06-25 ENCOUNTER — HOSPITAL ENCOUNTER (OUTPATIENT)
Facility: HOSPITAL | Age: 79
Setting detail: OBSERVATION
LOS: 1 days | Discharge: HOME | End: 2024-06-26
Attending: INTERNAL MEDICINE | Admitting: INTERNAL MEDICINE
Payer: MEDICARE

## 2024-06-25 DIAGNOSIS — I25.119 CORONARY ARTERY DISEASE WITH ANGINA PECTORIS (HCC): ICD-10-CM

## 2024-06-25 LAB
ACT BLD: 171 SECS (ref 79–138)
ACT BLD: 183 SECS (ref 79–138)
ACT BLD: 275 SECS (ref 79–138)
ACT BLD: 281 SECS (ref 79–138)
EKG ATRIAL RATE: 53 BPM
EKG DIAGNOSIS: NORMAL
EKG P AXIS: 27 DEGREES
EKG P-R INTERVAL: 162 MS
EKG Q-T INTERVAL: 492 MS
EKG QRS DURATION: 110 MS
EKG QTC CALCULATION (BAZETT): 461 MS
EKG R AXIS: 1 DEGREES
EKG T AXIS: 48 DEGREES
EKG VENTRICULAR RATE: 53 BPM
GLUCOSE BLD STRIP.AUTO-MCNC: 110 MG/DL (ref 70–110)
GLUCOSE BLD STRIP.AUTO-MCNC: 174 MG/DL (ref 70–110)
GLUCOSE BLD STRIP.AUTO-MCNC: 99 MG/DL (ref 70–110)
HCT VFR BLD AUTO: 37.9 % (ref 36–48)
HGB BLD-MCNC: 12.4 G/DL (ref 13–16)
Lab: NORMAL
Lab: NORMAL
REFERENCE LAB: NORMAL

## 2024-06-25 PROCEDURE — 99152 MOD SED SAME PHYS/QHP 5/>YRS: CPT | Performed by: INTERNAL MEDICINE

## 2024-06-25 PROCEDURE — 6370000000 HC RX 637 (ALT 250 FOR IP): Performed by: INTERNAL MEDICINE

## 2024-06-25 PROCEDURE — C1769 GUIDE WIRE: HCPCS | Performed by: INTERNAL MEDICINE

## 2024-06-25 PROCEDURE — C1894 INTRO/SHEATH, NON-LASER: HCPCS | Performed by: INTERNAL MEDICINE

## 2024-06-25 PROCEDURE — 85014 HEMATOCRIT: CPT

## 2024-06-25 PROCEDURE — 2580000003 HC RX 258: Performed by: INTERNAL MEDICINE

## 2024-06-25 PROCEDURE — 85347 COAGULATION TIME ACTIVATED: CPT

## 2024-06-25 PROCEDURE — C1874 STENT, COATED/COV W/DEL SYS: HCPCS | Performed by: INTERNAL MEDICINE

## 2024-06-25 PROCEDURE — 1100000003 HC PRIVATE W/ TELEMETRY

## 2024-06-25 PROCEDURE — 82962 GLUCOSE BLOOD TEST: CPT

## 2024-06-25 PROCEDURE — 99153 MOD SED SAME PHYS/QHP EA: CPT | Performed by: INTERNAL MEDICINE

## 2024-06-25 PROCEDURE — C9604 PERC D-E COR REVASC T CABG S: HCPCS | Performed by: INTERNAL MEDICINE

## 2024-06-25 PROCEDURE — 2500000003 HC RX 250 WO HCPCS: Performed by: INTERNAL MEDICINE

## 2024-06-25 PROCEDURE — 6360000002 HC RX W HCPCS: Performed by: INTERNAL MEDICINE

## 2024-06-25 PROCEDURE — C1887 CATHETER, GUIDING: HCPCS | Performed by: INTERNAL MEDICINE

## 2024-06-25 PROCEDURE — 85018 HEMOGLOBIN: CPT

## 2024-06-25 PROCEDURE — C1725 CATH, TRANSLUMIN NON-LASER: HCPCS | Performed by: INTERNAL MEDICINE

## 2024-06-25 PROCEDURE — 93459 L HRT ART/GRFT ANGIO: CPT | Performed by: INTERNAL MEDICINE

## 2024-06-25 PROCEDURE — 2709999900 HC NON-CHARGEABLE SUPPLY: Performed by: INTERNAL MEDICINE

## 2024-06-25 PROCEDURE — 6360000004 HC RX CONTRAST MEDICATION: Performed by: INTERNAL MEDICINE

## 2024-06-25 RX ORDER — HEPARIN SODIUM 200 [USP'U]/100ML
INJECTION, SOLUTION INTRAVENOUS CONTINUOUS PRN
Status: COMPLETED | OUTPATIENT
Start: 2024-06-25 | End: 2024-06-25

## 2024-06-25 RX ORDER — GLUCAGON 1 MG/ML
1 KIT INJECTION PRN
Status: DISCONTINUED | OUTPATIENT
Start: 2024-06-25 | End: 2024-06-26 | Stop reason: HOSPADM

## 2024-06-25 RX ORDER — INSULIN LISPRO 100 [IU]/ML
0-4 INJECTION, SOLUTION INTRAVENOUS; SUBCUTANEOUS
Status: DISCONTINUED | OUTPATIENT
Start: 2024-06-25 | End: 2024-06-26 | Stop reason: HOSPADM

## 2024-06-25 RX ORDER — SODIUM CHLORIDE 9 MG/ML
INJECTION, SOLUTION INTRAVENOUS CONTINUOUS
Status: DISCONTINUED | OUTPATIENT
Start: 2024-06-25 | End: 2024-06-25 | Stop reason: HOSPADM

## 2024-06-25 RX ORDER — ONDANSETRON 2 MG/ML
4 INJECTION INTRAMUSCULAR; INTRAVENOUS ONCE
Status: COMPLETED | OUTPATIENT
Start: 2024-06-25 | End: 2024-06-25

## 2024-06-25 RX ORDER — CLOPIDOGREL BISULFATE 75 MG/1
TABLET ORAL PRN
Status: DISCONTINUED | OUTPATIENT
Start: 2024-06-25 | End: 2024-06-25 | Stop reason: HOSPADM

## 2024-06-25 RX ORDER — ATORVASTATIN CALCIUM 20 MG/1
80 TABLET, FILM COATED ORAL NIGHTLY
Status: DISCONTINUED | OUTPATIENT
Start: 2024-06-25 | End: 2024-06-26 | Stop reason: HOSPADM

## 2024-06-25 RX ORDER — SODIUM CHLORIDE 0.9 % (FLUSH) 0.9 %
5-40 SYRINGE (ML) INJECTION EVERY 12 HOURS SCHEDULED
Status: DISCONTINUED | OUTPATIENT
Start: 2024-06-25 | End: 2024-06-26 | Stop reason: HOSPADM

## 2024-06-25 RX ORDER — LIDOCAINE HYDROCHLORIDE 10 MG/ML
INJECTION, SOLUTION INFILTRATION; PERINEURAL PRN
Status: DISCONTINUED | OUTPATIENT
Start: 2024-06-25 | End: 2024-06-25 | Stop reason: HOSPADM

## 2024-06-25 RX ORDER — SODIUM CHLORIDE 9 MG/ML
INJECTION, SOLUTION INTRAVENOUS PRN
Status: DISCONTINUED | OUTPATIENT
Start: 2024-06-25 | End: 2024-06-26 | Stop reason: HOSPADM

## 2024-06-25 RX ORDER — HEPARIN SODIUM 1000 [USP'U]/ML
INJECTION, SOLUTION INTRAVENOUS; SUBCUTANEOUS PRN
Status: DISCONTINUED | OUTPATIENT
Start: 2024-06-25 | End: 2024-06-25 | Stop reason: HOSPADM

## 2024-06-25 RX ORDER — DEXTROSE MONOHYDRATE 100 MG/ML
INJECTION, SOLUTION INTRAVENOUS CONTINUOUS PRN
Status: DISCONTINUED | OUTPATIENT
Start: 2024-06-25 | End: 2024-06-26 | Stop reason: HOSPADM

## 2024-06-25 RX ORDER — RANOLAZINE 500 MG/1
500 TABLET, EXTENDED RELEASE ORAL 2 TIMES DAILY
Status: DISCONTINUED | OUTPATIENT
Start: 2024-06-25 | End: 2024-06-26 | Stop reason: HOSPADM

## 2024-06-25 RX ORDER — EPTIFIBATIDE 2 MG/ML
INJECTION, SOLUTION INTRAVENOUS PRN
Status: DISCONTINUED | OUTPATIENT
Start: 2024-06-25 | End: 2024-06-25 | Stop reason: HOSPADM

## 2024-06-25 RX ORDER — LOSARTAN POTASSIUM 50 MG/1
50 TABLET ORAL DAILY
Status: DISCONTINUED | OUTPATIENT
Start: 2024-06-26 | End: 2024-06-26 | Stop reason: HOSPADM

## 2024-06-25 RX ORDER — INSULIN LISPRO 100 [IU]/ML
0-4 INJECTION, SOLUTION INTRAVENOUS; SUBCUTANEOUS NIGHTLY
Status: DISCONTINUED | OUTPATIENT
Start: 2024-06-25 | End: 2024-06-26 | Stop reason: HOSPADM

## 2024-06-25 RX ORDER — ASCORBIC ACID 500 MG
500 TABLET ORAL DAILY
COMMUNITY

## 2024-06-25 RX ORDER — MORPHINE SULFATE 2 MG/ML
1 INJECTION, SOLUTION INTRAMUSCULAR; INTRAVENOUS ONCE
Status: COMPLETED | OUTPATIENT
Start: 2024-06-25 | End: 2024-06-25

## 2024-06-25 RX ORDER — ACETAMINOPHEN 325 MG/1
650 TABLET ORAL EVERY 4 HOURS PRN
Status: DISCONTINUED | OUTPATIENT
Start: 2024-06-25 | End: 2024-06-26 | Stop reason: HOSPADM

## 2024-06-25 RX ORDER — CLOPIDOGREL BISULFATE 75 MG/1
75 TABLET ORAL DAILY
Status: DISCONTINUED | OUTPATIENT
Start: 2024-06-26 | End: 2024-06-26 | Stop reason: HOSPADM

## 2024-06-25 RX ORDER — SODIUM CHLORIDE 0.9 % (FLUSH) 0.9 %
5-40 SYRINGE (ML) INJECTION PRN
Status: DISCONTINUED | OUTPATIENT
Start: 2024-06-25 | End: 2024-06-26 | Stop reason: HOSPADM

## 2024-06-25 RX ORDER — MIDAZOLAM HYDROCHLORIDE 1 MG/ML
INJECTION INTRAMUSCULAR; INTRAVENOUS PRN
Status: DISCONTINUED | OUTPATIENT
Start: 2024-06-25 | End: 2024-06-25 | Stop reason: HOSPADM

## 2024-06-25 RX ORDER — UBIDECARENONE 75 MG
100 CAPSULE ORAL DAILY
COMMUNITY

## 2024-06-25 RX ORDER — ASPIRIN 81 MG/1
81 TABLET ORAL DAILY
Status: DISCONTINUED | OUTPATIENT
Start: 2024-06-26 | End: 2024-06-26 | Stop reason: HOSPADM

## 2024-06-25 RX ADMIN — MORPHINE SULFATE 1 MG: 2 INJECTION, SOLUTION INTRAMUSCULAR; INTRAVENOUS at 18:13

## 2024-06-25 RX ADMIN — ACETAMINOPHEN 650 MG: 325 TABLET ORAL at 21:41

## 2024-06-25 RX ADMIN — RANOLAZINE 500 MG: 500 TABLET, FILM COATED, EXTENDED RELEASE ORAL at 21:42

## 2024-06-25 RX ADMIN — SODIUM CHLORIDE, PRESERVATIVE FREE 10 ML: 5 INJECTION INTRAVENOUS at 21:43

## 2024-06-25 RX ADMIN — SODIUM CHLORIDE: 9 INJECTION, SOLUTION INTRAVENOUS at 10:52

## 2024-06-25 RX ADMIN — ONDANSETRON 4 MG: 2 INJECTION INTRAMUSCULAR; INTRAVENOUS at 15:38

## 2024-06-25 RX ADMIN — SODIUM CHLORIDE: 9 INJECTION, SOLUTION INTRAVENOUS at 17:44

## 2024-06-25 RX ADMIN — ATORVASTATIN CALCIUM 80 MG: 20 TABLET, FILM COATED ORAL at 21:43

## 2024-06-25 ASSESSMENT — PAIN SCALES - GENERAL
PAINLEVEL_OUTOF10: 6
PAINLEVEL_OUTOF10: 2
PAINLEVEL_OUTOF10: 0

## 2024-06-25 ASSESSMENT — PAIN DESCRIPTION - LOCATION
LOCATION: BACK
LOCATION: BACK

## 2024-06-25 NOTE — BRIEF OP NOTE
Brief Postoperative Note      Patient: Wiliam Richardson  YOB: 1945  MRN: [id number]    Date of Procedure: 6/25/2024    Pre-Op Diagnosis Codes:     * Coronary artery disease with angina pectoris (HCC) [I25.119]    Post-Op Diagnosis: Same       Procedure(s):  Left heart cath w coronary bypass graft  Percutaneous coronary intervention    Surgeon(s):  Tammie Mcdermott MD    Assistant:  * No surgical staff found *    Anesthesia: * No anesthesia type entered *    Estimated Blood Loss (mL): Minimal    Complications: None    Specimens:   * No specimens in log *    Implants:  Implant Name Type Inv. Item Serial No.  Lot No. LRB No. Used Action   STENT CORONARY ROSA FRONTIER RX 2.5X30 MM ZOTAROLIMUS ELUT - [id number] Coronary stents STENT CORONARY ROSA FRONTIER RX 2.5X30 MM ZOTAROLIMUS ELUT  [employer]WD [id number] N/A 1 Implanted         Drains: * No LDAs found *    Findings:  Infection Present At Time Of Surgery (PATOS) (choose all levels that have infection present):  No infection present  Other Findings:   LHC, Coronary angiogram, graft study and PCI of proximal radial to RPDA graft done via right femoral artery approach.     Coronary angiogram revealed left main and triple-vessel disease, patent LIMA to LAD and radial to RPDA graft had occlusion up to 85% in proximal part. PCI of proximal radial to RPDA graft done with JADE with good result.    LV gram was not done. LVEDP 14-17 mm hg. No significant gradient on pull back.     Patient tolerated procedure well.     Scant blood loss.  No complications.  No specimen removed.     Recommendation:    Medication considered:   Aspirin, beta blocker, ACEI, Nitrates and statin   Integrilin 2 boluses  Clopidogrel 600 mg loading dose followed by 75 mg once a day  CAD risk factor education  Diet education  Control cholesterol  Blood pressure control  Exercise education: Age and functional status appropriate.  Cardiac rehab referral

## 2024-06-25 NOTE — INTERVAL H&P NOTE
Update History & Physical    The patient's History and Physical of June 6, 2024 was reviewed with the patient and I examined the patient. There was no change. The surgical site was confirmed by the patient and me.     Plan: The risks, benefits, expected outcome, and alternative to the recommended procedure have been discussed with the patient. Patient understands and wants to proceed with the procedure.     Electronically signed by Tammie Mcdermott MD on 6/25/2024 at 1:01 PM

## 2024-06-25 NOTE — PRE SEDATION
Sedation Plan  ASA: class 2 - patient with mild systemic disease     Mallampati class: II - soft palate, uvula, fauces visible.    Sedation plan: local anesthesia and moderate (conscious sedation)    Risks, benefits, and alternatives discussed with patient.  Use of blood products discussed with patient who consented to blood products.       Immediate reassessment prior to sedation:  Patient's status reviewed and vital signs assessed; acceptable to perform procedure and proceed to administer sedation as planned.

## 2024-06-25 NOTE — PROGRESS NOTES
TRANSFER - OUT REPORT:    Verbal report given to TERE Stockton RN on Wiliam Richardson  being transferred to [address] [place] [address] for ordered procedure       Report consisted of patient's Situation, Background, Assessment and   Recommendations(SBAR).     Information from the following report(s) Nurse Handoff Report, Intake/Output, MAR, Recent Results, and Cardiac Rhythm sb  was reviewed with the receiving nurse.           Lines:   Peripheral IV 06/25/24 Right Forearm (Active)   Site Assessment Clean, dry & intact 06/25/24 1050   Line Status Capped;Flushed;Blood return noted 06/25/24 1050   Line Care Connections checked and tightened 06/25/24 1050   Phlebitis Assessment No symptoms 06/25/24 1050   Infiltration Assessment 0 06/25/24 1050   Alcohol Cap Used Yes 06/25/24 1050   Dressing Status New dressing applied 06/25/24 1050   Dressing Type Transparent 06/25/24 1050   Dressing Intervention New 06/25/24 1050        Opportunity for questions and clarification was provided.      Patient transported with:  Monitor, O2 @ 2lpm, Registered Nurse, and Tech

## 2024-06-25 NOTE — PROGRESS NOTES
Prepped & ready for procedure.   1500 Back from cath. Flat in bed, right groin with arterial sheath intact connected to pressure bag per cath team. Pulses present both feet. Dressing intact right groin no bleeding or swelling. Pt understands not to bend right leg or  head. Wife at bedside. Bed out in [facility] Wife at bedside assisting with diet. O2 sats 88%. O2 improved to 100%.   1530 c/o of nausea, denies chest pain. Pt had eaten chips & sandwich. Dr. Mcdermott aware, order for 12 lead EKG.   1538 Zofran given, EKG done.   1545 Sleeping.   1610 Awake, denies pain or nausea. C/o of being cold. Extra warm blankets applied.   1731 ACT drawn by LEONIDAS Rose RN cath lab.   1810 Dr. Mcdermott in, c/o of back pain and needing to urinate. Unable to use unrinal. Order for pain med & straight cath.   1810 ACT redrawn by Jasmeet BRADSHAW  1813 Medicated as directed. Straight cath inserted using sterile technique. Return of clear yellow urine, drained 550 ml. Straight cath removed. Tolerated well.   1835 Right femoral arterial sheath removed by LEONIDAS Rose RN cath lab, Jasmeet BRADSHAW at bedside. Manual pressure being held. Education to pt & wife about what to watch out for if bleeding or hematoma develops. Verbalized understanding.   1900 Cardiac packet given including stent card, written information on cardiac rehab, plavix, diet, & exercise.  2015 Transferred to room 326 via bed in stable condition to telemetry. Denies pain. Right groin dressing intact & dry. No bleeding or swelling. BR with hob flat until 0030 tonight.

## 2024-06-25 NOTE — DISCHARGE INSTRUCTIONS
Cardiac Catheterization/Angiography Discharge Instructions    *Check the puncture site frequently for swelling or bleeding. If you see any bleeding, lie down and apply pressure over the area with a clean town or washcloth. Notify your doctor for any redness, swelling, drainage or oozing from the puncture site. Notify your doctor for any fever or chills.    *If the leg or arm with the puncture becomes cold, numb or painful, call Dr Mcdermott     *Activity should be limited for the next 48 hours. Climb stairs as little as possible and avoid any stooping, bending or strenuous activity for 48 hours. No heavy lifting (anything over 10 pounds) for 1 week.     *Do not drive for 24 hours.    *You may resume your usual diet. Drink more fluids than usual.    *Have a responsible person drive you home and stay with you for at least 24 hours after your heart catheterization/angiography.    *You may remove the bandage from your Right and [place] 24 hours. You may shower in 24 hours. No tub baths, hot tubs or swimming for one week. Do not place any lotions, creams, powders, ointments over the puncture site for one week. You may place a clean band-aid over the puncture site each day for 5 days. Change this daily.                         Important - Please follow-up with Cardiac Rehab 7 days out from discharge - This appointment will be made for you. Please call [phone] to confirm exact date and time of appointment.

## 2024-06-25 NOTE — POST SEDATION
Sedation Post Procedure Note    Patient Name: Wiliam Richardson   YOB: 1945  Room/Bed: [facility]/  Medical Record Number: [id number]  Date: 6/25/2024   Time: 4:24 PM         Physicians/Assistants: Tammie Mcdermott MD, MD    Procedure Performed:  LHC, coronary angiogram, graft study, PCI of radial to PDA proximal graft     Post-Sedation Vital Signs:  Vitals:    06/25/24 1600   BP: (!) 145/71   Pulse: 52   Resp: 12   Temp:    SpO2: 100%      Vital signs were reviewed and were stable after the procedure (see flow sheet for vitals)            Post-Sedation Exam: Lungs: clear and Cardiovascular: normal           Complications: none    Electronically signed by Tammie Mcdermott MD on 6/25/2024 at 4:24 PM

## 2024-06-26 VITALS
RESPIRATION RATE: 16 BRPM | DIASTOLIC BLOOD PRESSURE: 50 MMHG | OXYGEN SATURATION: 96 % | HEIGHT: 69 IN | TEMPERATURE: 96.8 F | WEIGHT: 150.8 LBS | HEART RATE: 60 BPM | SYSTOLIC BLOOD PRESSURE: 120 MMHG | BODY MASS INDEX: 22.33 KG/M2

## 2024-06-26 PROBLEM — I25.10 3-VESSEL CAD: Status: ACTIVE | Noted: 2024-06-26

## 2024-06-26 LAB
GLUCOSE BLD STRIP.AUTO-MCNC: 117 MG/DL (ref 70–110)
GLUCOSE BLD STRIP.AUTO-MCNC: 132 MG/DL (ref 70–110)
GLUCOSE BLD STRIP.AUTO-MCNC: 140 MG/DL (ref 70–110)
GLUCOSE BLD STRIP.AUTO-MCNC: 156 MG/DL (ref 70–110)

## 2024-06-26 PROCEDURE — 82962 GLUCOSE BLOOD TEST: CPT

## 2024-06-26 PROCEDURE — 2580000003 HC RX 258: Performed by: INTERNAL MEDICINE

## 2024-06-26 PROCEDURE — 6370000000 HC RX 637 (ALT 250 FOR IP): Performed by: INTERNAL MEDICINE

## 2024-06-26 PROCEDURE — G0378 HOSPITAL OBSERVATION PER HR: HCPCS

## 2024-06-26 RX ORDER — CLOPIDOGREL BISULFATE 75 MG/1
75 TABLET ORAL DAILY
Qty: 90 TABLET | Refills: 3 | Status: SHIPPED | OUTPATIENT
Start: 2024-06-27

## 2024-06-26 RX ADMIN — CLOPIDOGREL BISULFATE 75 MG: 75 TABLET ORAL at 09:20

## 2024-06-26 RX ADMIN — SODIUM CHLORIDE, PRESERVATIVE FREE 10 ML: 5 INJECTION INTRAVENOUS at 09:20

## 2024-06-26 RX ADMIN — RANOLAZINE 500 MG: 500 TABLET, FILM COATED, EXTENDED RELEASE ORAL at 09:20

## 2024-06-26 RX ADMIN — LOSARTAN POTASSIUM 50 MG: 50 TABLET, FILM COATED ORAL at 09:20

## 2024-06-26 RX ADMIN — ASPIRIN 81 MG: 81 TABLET, COATED ORAL at 09:20

## 2024-06-26 RX ADMIN — METOPROLOL TARTRATE 25 MG: 25 TABLET, FILM COATED ORAL at 09:20

## 2024-06-26 NOTE — PROGRESS NOTES
Care  assisting Care Mgr TEETEE Martell with Discharge Planning needs for patient.  Updated notes sent to [facility] as requested.    Will continue to follow along for further discharge plans.

## 2024-06-26 NOTE — DISCHARGE SUMMARY
Where to Get Your Medications        These medications were sent to [facility] #[id number] - [place], [place] - [address] [address] - P [phone] - F [phone] [address] [address] [facility] [address]      Phone: [phone]   clopidogrel 75 MG tablet  metFORMIN 500 MG tablet         Procedures : LHC, Coronary angiogram, graft study and PCI of proximal radial to RPDA graft     Consults: None      PHYSICAL EXAM   Visit Vitals  BP (!) 107/55   Pulse 62   Temp 97.7 °F (36.5 °C) (Oral)   Resp 16   Ht 1.753 m (5' 9\")   Wt 68.4 kg (150 lb 12.8 oz)   SpO2 94%   BMI 22.27 kg/m²     General: Awake, cooperative, no acute distress    HEENT: NC, Atraumatic.  PERRLA, EOMI. Anicteric sclerae.  Lungs:  CTA Bilaterally. No Wheezing/Rhonchi/Rales.  Heart:  Regular  rhythm,  No murmur, No Rubs, No Gallops  Abdomen: Soft, Non distended, Non tender. +Bowel sounds,   Extremities: No c/c/e  Psych:   Not anxious or agitated.  Neurologic:  No acute neurological deficits.                                     Admission HPI : LHC, Coronary angiogram, graft study and PCI of proximal radial to RPDA graft done via right femoral artery approach.      Coronary angiogram revealed left main and triple-vessel disease, patent LIMA to LAD and radial to RPDA graft had occlusion up to 85% in proximal part. PCI of proximal radial to RPDA graft done with JADE with good result.     LV gram was not done. LVEDP 14-17 mm hg. No significant gradient on pull back.      Patient tolerated procedure well.      Scant blood loss.  No complications.  No specimen removed.     Hospital Course :      patient was admitted for overnight observation.  Denies any pain at procedure site.  No arrhythmias noted on telemetry.  Medication considered:   Aspirin, beta blocker, ACEI, Nitrates and statin   Integrilin 2 boluses  Clopidogrel 600 mg loading dose followed by 75 mg once a day  CAD risk factor education  Diet education  Control cholesterol  Blood pressure

## 2024-06-26 NOTE — CARE COORDINATION
06/26/24 1117   Service Assessment   Patient Orientation Alert and Oriented;Person;Place;Situation;Self   Cognition Alert   History Provided By Patient   Primary Caregiver Self   Accompanied By/Relationship Alem Richardson/ Patient's spouse   Support Systems Children;Family Members   Patient's Healthcare Decision Maker is: Legal Next of Kin   PCP Verified by CM Yes  (Patient verified his PCP as Dr. Benavides)   Last Visit to PCP Within last 3 months   Prior Functional Level Independent in ADLs/IADLs   Current Functional Level Independent in ADLs/IADLs   Can patient return to prior living arrangement Yes   Ability to make needs known: Good   Family able to assist with home care needs: Yes  (Patient report that he has enough support at home from wife.)   Would you like for me to discuss the discharge plan with any other family members/significant others, and if so, who? Yes  (POC: Patient's wife - Aelm Richardson)   [place]  ([place];Other (Comment)  ( for LIfe Medicare Supplement and 50% service connected [facility])   Community Resources Other (Comment)  ([facility])   CM/SW Referral Other (see comment)  (N/A)   Discharge Planning   Type of Residence House   Living Arrangements Spouse/Significant Other   Current Services Prior To Admission C-pap   Potential Assistance Needed N/A   DME Ordered? No   Potential Assistance Purchasing Medications No   Type of Home Care Services None   Patient expects to be discharged to: House   Follow Up Appointment: Best Day/Time    (Not specified)   One/Two Story Residence Two story   Services At/After Discharge   Transition of Care Consult (CM Consult) N/A   Services At/After Discharge Outpatient   [place] Resource Information Provided? No  (Patient already is service connected)   Mode of Transport at Discharge Other (see comment)  (Patient's spouse)   Confirm Follow Up Transport Self   Condition of Participation: Discharge Planning   The Plan for Transition of Care is

## 2024-06-27 ENCOUNTER — TELEPHONE (OUTPATIENT)
Facility: HOSPITAL | Age: 79
End: 2024-06-27

## 2024-06-27 NOTE — TELEPHONE ENCOUNTER
Cardiac Rehab called pt to offer services after receiving signed referral from cardiologist. Pt did not answer, left voicemail with clinic call back number. Will attempt again at a later time.    Chacorta Valle   6/27/2024 10:43 AM

## 2024-07-01 LAB
EKG ATRIAL RATE: 53 BPM
EKG DIAGNOSIS: NORMAL
EKG P AXIS: 27 DEGREES
EKG P-R INTERVAL: 162 MS
EKG Q-T INTERVAL: 492 MS
EKG QRS DURATION: 110 MS
EKG QTC CALCULATION (BAZETT): 461 MS
EKG R AXIS: 1 DEGREES
EKG T AXIS: 48 DEGREES
EKG VENTRICULAR RATE: 53 BPM

## 2024-07-31 DIAGNOSIS — Z98.61 POST PTCA: Primary | ICD-10-CM

## 2024-08-05 ENCOUNTER — HOSPITAL ENCOUNTER (OUTPATIENT)
Facility: HOSPITAL | Age: 79
Setting detail: RECURRING SERIES
Discharge: HOME | End: 2024-08-08
Payer: MEDICARE

## 2024-08-05 VITALS — BODY MASS INDEX: 22.15 KG/M2 | WEIGHT: 150 LBS

## 2024-08-05 PROCEDURE — 93798 PHYS/QHP OP CAR RHAB W/ECG: CPT

## 2024-08-05 ASSESSMENT — LIFESTYLE VARIABLES: SMOKELESS_TOBACCO: NO

## 2024-08-05 ASSESSMENT — PATIENT HEALTH QUESTIONNAIRE - PHQ9
SUM OF ALL RESPONSES TO PHQ QUESTIONS 1-9: 3
9. THOUGHTS THAT YOU WOULD BE BETTER OFF DEAD, OR OF HURTING YOURSELF: NOT AT ALL
6. FEELING BAD ABOUT YOURSELF - OR THAT YOU ARE A FAILURE OR HAVE LET YOURSELF OR YOUR FAMILY DOWN: NOT AT ALL
SUM OF ALL RESPONSES TO PHQ QUESTIONS 1-9: 3
SUM OF ALL RESPONSES TO PHQ QUESTIONS 1-9: 3
2. FEELING DOWN, DEPRESSED OR HOPELESS: NOT AT ALL
SUM OF ALL RESPONSES TO PHQ9 QUESTIONS 1 & 2: 1
7. TROUBLE CONCENTRATING ON THINGS, SUCH AS READING THE NEWSPAPER OR WATCHING TELEVISION: NOT AT ALL
3. TROUBLE FALLING OR STAYING ASLEEP: NOT AT ALL
10. IF YOU CHECKED OFF ANY PROBLEMS, HOW DIFFICULT HAVE THESE PROBLEMS MADE IT FOR YOU TO DO YOUR WORK, TAKE CARE OF THINGS AT HOME, OR GET ALONG WITH OTHER PEOPLE: NOT DIFFICULT AT ALL
5. POOR APPETITE OR OVEREATING: NOT AT ALL
8. MOVING OR SPEAKING SO SLOWLY THAT OTHER PEOPLE COULD HAVE NOTICED. OR THE OPPOSITE, BEING SO FIGETY OR RESTLESS THAT YOU HAVE BEEN MOVING AROUND A LOT MORE THAN USUAL: SEVERAL DAYS
4. FEELING TIRED OR HAVING LITTLE ENERGY: SEVERAL DAYS
1. LITTLE INTEREST OR PLEASURE IN DOING THINGS: SEVERAL DAYS
SUM OF ALL RESPONSES TO PHQ QUESTIONS 1-9: 3

## 2024-08-05 ASSESSMENT — EXERCISE STRESS TEST
PEAK_BP: 126/62
PEAK_RPE: 1.5
PEAK_BP: 126/62
PEAK_HR: 56
PEAK_HR: 122
PEAK_METS: 2
PEAK_RPE: 1.5
PEAK_RPD: 1
PEAK_BP: 126/62

## 2024-08-05 ASSESSMENT — EJECTION FRACTION: EF_VALUE: 55

## 2024-08-05 ASSESSMENT — NEW YORK HEART ASSOCIATION (NYHA) CLASSIFICATION: NYHA FUNCTIONAL CLASS: NO NYHA CLASS OR UNABLE TO DETERMINE

## 2024-08-05 NOTE — PROGRESS NOTES
Wiliam Richardson has received an intake packet that consist of education on normal cholesterol levels with recent results, normal ejection fraction percentage with recent results, and normal A1C levels with recent results. The benefits of exercising 2-3x a week for a minimum of 30 minutes, maintaining a heart healthy diet, and deep breathing exercises were explained in details with the patient. The meaning of the systolic and diastolic number of blood pressure; the signs and symptoms of low and high blood pressure was explained in details with the patient.     Teach back method was effective. Wiliam Richardson verbalized if he has any questions as far as the material that was discussed during the intake process or any matters involving physical or mental health, he will inform one of the staff members.   
Pt will increase resistance and stamina to maximize cardiac benefits.    4. Increase peak MET by 1.0 by the end of next recert.    [x] initial  [] met                  [] not met  [] progressing Pt will increase resistance and speed to increase METs.      Outcomes Assessment    Duration   [x] initial    6   MET   [x] initial    2   RPE [x] initial    1.5   Self-report Home Exercise   Min/per week   [x] initial  10-15 minutes/ 2-3x week       Cardiac ITP    Treatment Diagnosis  Treatment Diagnosis 1: PCI (PTCA)  PCI Date: 06/25/24  Treatment Diagnosis 2: Angina  Referral Date: 06/25/24         Oxygen Intervention  Oxygen Use: Yes  Oxygen Type : Nasal canula  Patients Liter Flow : 2L PRN  O2 Sat Greater Than 90%: Yes (On oxygen)  Oxygen Education : Oxygen Safety / Hazaard, Types of Oxygen, Emergency Oxygen useage, Proper care of Oxygen Equipment  Oxygen Target Goals : Understand use of oxygen, Keep SA02 greater than 90%, Use oxygen as needed    Individual Treatment Plan  ITP Visit Type: Initial assessment  1st Date of Exercise : 08/05/24  ITP Next Review Date: 09/02/24  Visit #/Total Visits: 1/36  EF%: 55 %  Risk Stratification: Low  ITP: Exercise, Psychosocial, Tobacco, Nutrition, Education   Exercise   Stages of Change: Preparation  DASI Total Score: 18.95  Assisted Devices: None  Wolf Total Score: 15  Test: Six minute walk test    Data Measured Before Walk  Heart Rate: 58  Blood Pressure: 108/64  O2 Saturation: 99  O2 Device: Nasal cannula  O2 Flow Rate (l/min): 2 l/min  RPD: 0  RPE: 0    Data Measured during Walk  Indicate Mode of RPE: 6 minutes  RPE Data Measured: During  Treadmill Speed: 1.3 mph  Symptoms: SOB (1/10)  Any problems while exercising: SOB 1/10  Do You Have Shortness of Breath: Yes (1/10)  Describe Type of Pain You Are Having: Denies  Peak RPE: 1.5  Peak RPD: 1  NYHA Heart Failure Classification: No NYHA class or unable to determine    Data Measured Immediately After Walk  Distance Walked in :

## 2024-08-12 ENCOUNTER — HOSPITAL ENCOUNTER (OUTPATIENT)
Facility: HOSPITAL | Age: 79
Setting detail: RECURRING SERIES
Discharge: HOME | End: 2024-08-15
Payer: MEDICARE

## 2024-08-12 VITALS — BODY MASS INDEX: 22.08 KG/M2 | WEIGHT: 149.5 LBS

## 2024-08-12 LAB
GLUCOSE BLD STRIP.AUTO-MCNC: 77 MG/DL (ref 70–110)
GLUCOSE BLD STRIP.AUTO-MCNC: 93 MG/DL (ref 70–110)

## 2024-08-12 PROCEDURE — 82962 GLUCOSE BLOOD TEST: CPT

## 2024-08-12 PROCEDURE — 93798 PHYS/QHP OP CAR RHAB W/ECG: CPT

## 2024-08-12 ASSESSMENT — EXERCISE STRESS TEST
PEAK_METS: 2.7
PEAK_HR: 86
PEAK_BP: 112/60
PEAK_RPE: 2

## 2024-08-14 ENCOUNTER — HOSPITAL ENCOUNTER (OUTPATIENT)
Facility: HOSPITAL | Age: 79
Setting detail: RECURRING SERIES
Discharge: HOME | End: 2024-08-17
Payer: MEDICARE

## 2024-08-14 VITALS — BODY MASS INDEX: 22.08 KG/M2 | WEIGHT: 149.5 LBS

## 2024-08-14 LAB — GLUCOSE BLD STRIP.AUTO-MCNC: 176 MG/DL (ref 70–110)

## 2024-08-14 PROCEDURE — 93798 PHYS/QHP OP CAR RHAB W/ECG: CPT

## 2024-08-14 PROCEDURE — 82962 GLUCOSE BLOOD TEST: CPT

## 2024-08-14 ASSESSMENT — EXERCISE STRESS TEST
PEAK_RPE: 2.5
PEAK_HR: 99
PEAK_METS: 2.7
PEAK_BP: 114/61

## 2024-08-16 ENCOUNTER — HOSPITAL ENCOUNTER (OUTPATIENT)
Facility: HOSPITAL | Age: 79
Setting detail: RECURRING SERIES
Discharge: HOME | End: 2024-08-19
Payer: MEDICARE

## 2024-08-16 VITALS — WEIGHT: 150.5 LBS | BODY MASS INDEX: 22.22 KG/M2

## 2024-08-16 LAB — GLUCOSE BLD STRIP.AUTO-MCNC: 140 MG/DL (ref 70–110)

## 2024-08-16 PROCEDURE — 82962 GLUCOSE BLOOD TEST: CPT

## 2024-08-16 PROCEDURE — 93798 PHYS/QHP OP CAR RHAB W/ECG: CPT

## 2024-08-16 ASSESSMENT — EXERCISE STRESS TEST
PEAK_RPE: 3
PEAK_HR: 94
PEAK_METS: 2.6
PEAK_BP: 122/70

## 2024-08-19 ENCOUNTER — HOSPITAL ENCOUNTER (OUTPATIENT)
Facility: HOSPITAL | Age: 79
Setting detail: RECURRING SERIES
Discharge: HOME | End: 2024-08-22
Payer: MEDICARE

## 2024-08-19 VITALS — BODY MASS INDEX: 22.3 KG/M2 | WEIGHT: 151 LBS

## 2024-08-19 PROCEDURE — 93798 PHYS/QHP OP CAR RHAB W/ECG: CPT

## 2024-08-19 ASSESSMENT — EXERCISE STRESS TEST
PEAK_HR: 92
PEAK_METS: 2.6
PEAK_RPE: 5
PEAK_BP: 122/76

## 2024-08-21 ENCOUNTER — HOSPITAL ENCOUNTER (OUTPATIENT)
Facility: HOSPITAL | Age: 79
Setting detail: RECURRING SERIES
Discharge: HOME | End: 2024-08-24
Payer: MEDICARE

## 2024-08-21 VITALS — WEIGHT: 152 LBS | BODY MASS INDEX: 22.45 KG/M2

## 2024-08-21 PROCEDURE — 93798 PHYS/QHP OP CAR RHAB W/ECG: CPT

## 2024-08-21 ASSESSMENT — EXERCISE STRESS TEST
PEAK_HR: 87
PEAK_BP: 120/78
PEAK_METS: 2.6
PEAK_RPE: 2.5

## 2024-08-23 ENCOUNTER — HOSPITAL ENCOUNTER (OUTPATIENT)
Facility: HOSPITAL | Age: 79
Setting detail: RECURRING SERIES
Discharge: HOME | End: 2024-08-26
Payer: MEDICARE

## 2024-08-26 ENCOUNTER — HOSPITAL ENCOUNTER (OUTPATIENT)
Facility: HOSPITAL | Age: 79
Setting detail: RECURRING SERIES
Discharge: HOME | End: 2024-08-29
Payer: MEDICARE

## 2024-08-26 VITALS — WEIGHT: 151 LBS | BODY MASS INDEX: 22.3 KG/M2

## 2024-08-26 PROCEDURE — 93798 PHYS/QHP OP CAR RHAB W/ECG: CPT

## 2024-08-26 ASSESSMENT — EXERCISE STRESS TEST
PEAK_BP: 120/73
PEAK_RPE: 2.5
PEAK_HR: 83
PEAK_METS: 4.3

## 2024-08-26 NOTE — PROGRESS NOTES
Cardiac Rehab Re-Assessment Questionnaire    Do you currently do any exercise at home/outside of the clinic ?    Yes    If yes, what type ? Dumbell    How many minutes per day ?   20 minutes     How may days per week ? 3x week        Do you feel that cardiac rehab is benefiting you ?    Yes    If no, what goals have we not addressed ?        What new goals do you have ?     Pt states, \"Improve my health.\"    Are you currently using tobacco?    No          Will continue to aid patient towards accomplishing  goal.

## 2024-08-28 ENCOUNTER — HOSPITAL ENCOUNTER (OUTPATIENT)
Facility: HOSPITAL | Age: 79
Setting detail: RECURRING SERIES
Discharge: HOME | End: 2024-08-31
Payer: MEDICARE

## 2024-08-28 VITALS — WEIGHT: 149 LBS | BODY MASS INDEX: 22 KG/M2

## 2024-08-28 PROCEDURE — 93798 PHYS/QHP OP CAR RHAB W/ECG: CPT

## 2024-08-28 ASSESSMENT — EXERCISE STRESS TEST
PEAK_BP: 109/65
PEAK_METS: 2.6
PEAK_RPE: 4
PEAK_HR: 92

## 2024-08-30 ENCOUNTER — APPOINTMENT (OUTPATIENT)
Facility: HOSPITAL | Age: 79
End: 2024-08-30
Payer: MEDICARE

## 2024-09-04 ENCOUNTER — HOSPITAL ENCOUNTER (OUTPATIENT)
Facility: HOSPITAL | Age: 79
Setting detail: RECURRING SERIES
Discharge: HOME | End: 2024-09-07
Payer: MEDICARE

## 2024-09-04 VITALS — WEIGHT: 150 LBS | BODY MASS INDEX: 22.15 KG/M2

## 2024-09-04 PROCEDURE — 93798 PHYS/QHP OP CAR RHAB W/ECG: CPT

## 2024-09-04 ASSESSMENT — EXERCISE STRESS TEST
PEAK_RPE: 2.5
PEAK_BP: 105/67
PEAK_HR: 85
PEAK_METS: 2.6

## 2024-09-06 ENCOUNTER — HOSPITAL ENCOUNTER (OUTPATIENT)
Facility: HOSPITAL | Age: 79
Setting detail: RECURRING SERIES
Discharge: HOME | End: 2024-09-09
Payer: MEDICARE

## 2024-09-06 VITALS — WEIGHT: 150 LBS | BODY MASS INDEX: 22.15 KG/M2

## 2024-09-06 PROCEDURE — 93798 PHYS/QHP OP CAR RHAB W/ECG: CPT

## 2024-09-06 ASSESSMENT — EXERCISE STRESS TEST
PEAK_RPE: 3
PEAK_BP: 108/69
PEAK_METS: 2.6
PEAK_HR: 86

## 2024-09-09 ENCOUNTER — HOSPITAL ENCOUNTER (OUTPATIENT)
Facility: HOSPITAL | Age: 79
Setting detail: RECURRING SERIES
Discharge: HOME | End: 2024-09-12
Payer: MEDICARE

## 2024-09-09 VITALS — BODY MASS INDEX: 22.3 KG/M2 | WEIGHT: 151 LBS

## 2024-09-09 PROCEDURE — 93798 PHYS/QHP OP CAR RHAB W/ECG: CPT

## 2024-09-09 ASSESSMENT — EXERCISE STRESS TEST
PEAK_METS: 2.6
PEAK_BP: 114/63
PEAK_RPE: 3
PEAK_HR: 84

## 2024-09-11 ENCOUNTER — HOSPITAL ENCOUNTER (OUTPATIENT)
Facility: HOSPITAL | Age: 79
Setting detail: RECURRING SERIES
Discharge: HOME | End: 2024-09-14
Payer: MEDICARE

## 2024-09-11 VITALS — BODY MASS INDEX: 22 KG/M2 | WEIGHT: 149 LBS

## 2024-09-11 PROCEDURE — 93798 PHYS/QHP OP CAR RHAB W/ECG: CPT

## 2024-09-11 ASSESSMENT — EXERCISE STRESS TEST
PEAK_BP: 118/71
PEAK_HR: 90
PEAK_RPE: 4
PEAK_METS: 2.6

## 2024-09-13 ENCOUNTER — HOSPITAL ENCOUNTER (OUTPATIENT)
Facility: HOSPITAL | Age: 79
Setting detail: RECURRING SERIES
Discharge: HOME | End: 2024-09-16
Payer: MEDICARE

## 2024-09-13 VITALS — BODY MASS INDEX: 21.86 KG/M2 | WEIGHT: 148 LBS

## 2024-09-13 PROCEDURE — 93798 PHYS/QHP OP CAR RHAB W/ECG: CPT

## 2024-09-13 ASSESSMENT — EXERCISE STRESS TEST
PEAK_BP: 111/65
PEAK_METS: 3
PEAK_RPE: 2.5
PEAK_HR: 87

## 2024-09-16 ENCOUNTER — HOSPITAL ENCOUNTER (OUTPATIENT)
Facility: HOSPITAL | Age: 79
Setting detail: RECURRING SERIES
Discharge: HOME | End: 2024-09-19
Payer: MEDICARE

## 2024-09-16 VITALS — BODY MASS INDEX: 22.15 KG/M2 | WEIGHT: 150 LBS

## 2024-09-16 PROCEDURE — 93798 PHYS/QHP OP CAR RHAB W/ECG: CPT

## 2024-09-16 ASSESSMENT — EXERCISE STRESS TEST
PEAK_HR: 84
PEAK_METS: 3
PEAK_RPE: 3
PEAK_BP: 109/52

## 2024-09-18 ENCOUNTER — HOSPITAL ENCOUNTER (OUTPATIENT)
Facility: HOSPITAL | Age: 79
Setting detail: RECURRING SERIES
Discharge: HOME | End: 2024-09-21
Payer: MEDICARE

## 2024-09-18 VITALS — BODY MASS INDEX: 22.08 KG/M2 | WEIGHT: 149.5 LBS

## 2024-09-18 PROCEDURE — 93798 PHYS/QHP OP CAR RHAB W/ECG: CPT

## 2024-09-18 ASSESSMENT — EXERCISE STRESS TEST
PEAK_HR: 95
PEAK_RPE: 4
PEAK_BP: 126/56

## 2024-09-20 ENCOUNTER — HOSPITAL ENCOUNTER (OUTPATIENT)
Facility: HOSPITAL | Age: 79
Setting detail: RECURRING SERIES
Discharge: HOME | End: 2024-09-23
Payer: MEDICARE

## 2024-09-20 VITALS — BODY MASS INDEX: 22.15 KG/M2 | WEIGHT: 150 LBS

## 2024-09-20 PROCEDURE — 93798 PHYS/QHP OP CAR RHAB W/ECG: CPT

## 2024-09-20 ASSESSMENT — EXERCISE STRESS TEST
PEAK_HR: 84
PEAK_RPE: 4
PEAK_BP: 119/69
PEAK_METS: 3

## 2024-09-23 ENCOUNTER — HOSPITAL ENCOUNTER (OUTPATIENT)
Facility: HOSPITAL | Age: 79
Setting detail: RECURRING SERIES
Discharge: HOME | End: 2024-09-26
Payer: MEDICARE

## 2024-09-23 VITALS — WEIGHT: 150.5 LBS | BODY MASS INDEX: 22.22 KG/M2

## 2024-09-23 PROCEDURE — 93798 PHYS/QHP OP CAR RHAB W/ECG: CPT

## 2024-09-23 ASSESSMENT — EXERCISE STRESS TEST
PEAK_BP: 125/57
PEAK_HR: 86
PEAK_METS: 3
PEAK_RPE: 4

## 2024-09-25 ENCOUNTER — HOSPITAL ENCOUNTER (OUTPATIENT)
Facility: HOSPITAL | Age: 79
Setting detail: RECURRING SERIES
Discharge: HOME | End: 2024-09-28
Payer: MEDICARE

## 2024-09-25 VITALS — WEIGHT: 150 LBS | BODY MASS INDEX: 22.15 KG/M2

## 2024-09-25 PROCEDURE — 93798 PHYS/QHP OP CAR RHAB W/ECG: CPT

## 2024-09-25 ASSESSMENT — EXERCISE STRESS TEST
PEAK_METS: 3.2
PEAK_RPE: 3
PEAK_BP: 120/73
PEAK_HR: 87

## 2024-09-27 ENCOUNTER — HOSPITAL ENCOUNTER (OUTPATIENT)
Facility: HOSPITAL | Age: 79
Setting detail: RECURRING SERIES
Discharge: HOME | End: 2024-09-30
Payer: MEDICARE

## 2024-09-27 VITALS — WEIGHT: 151 LBS | BODY MASS INDEX: 22.3 KG/M2

## 2024-09-27 PROCEDURE — 93798 PHYS/QHP OP CAR RHAB W/ECG: CPT

## 2024-09-27 ASSESSMENT — EXERCISE STRESS TEST
PEAK_HR: 85
PEAK_BP: 114/60
PEAK_RPE: 5

## 2024-09-30 ENCOUNTER — HOSPITAL ENCOUNTER (OUTPATIENT)
Facility: HOSPITAL | Age: 79
Setting detail: RECURRING SERIES
Discharge: HOME | End: 2024-10-03
Payer: MEDICARE

## 2024-09-30 VITALS — BODY MASS INDEX: 22.3 KG/M2 | WEIGHT: 151 LBS

## 2024-09-30 PROCEDURE — 93798 PHYS/QHP OP CAR RHAB W/ECG: CPT

## 2024-09-30 ASSESSMENT — EXERCISE STRESS TEST
PEAK_HR: 96
PEAK_METS: 3.2
PEAK_BP: 107/54
PEAK_RPE: 3.5

## 2024-10-01 ASSESSMENT — NEW YORK HEART ASSOCIATION (NYHA) CLASSIFICATION: NYHA FUNCTIONAL CLASS: NO NYHA CLASS OR UNABLE TO DETERMINE

## 2024-10-01 NOTE — PROGRESS NOTES
less than 30 minutes  Resistance Training: Yes    Exercise Education  Education: Self pulse, Exercise safety, Signs/symptoms to report, RPE scale, Warm up/cool down, Equipment orientation, Physically active, Attended class    Exercise Goals  Patient Target Goals: Individual exercise RX, Maintain exercise and activity at a moderate intensity, Aerobic activity 30 + minutes/day  5 days/week  Patient Treatment Goal: Pt will increase endurance at [facility] rehab by the end of the program.  Goal Status: In progress    Psychosocial Assessment  Stages of Change: Action    Psychosocial Intervention  Interventions: No intervention indicated  Stress Management Techniques: Uses guided meditation, Practices deep breathing, Maintains physical exercise and healthy nutrition, Connects with others  Currently Taking Psychotropic Meds: No  Medication Changes: No    Psychosocial Education  Education: Advanced directives, Benefits of CPR completion, Cardiac meds, Coping techniques, Environmental triggers, Relaxation techniques, Sexual activity, Stress management class, Support groups    Psychosocial Goals  Patient Target Goals: Assess presence or absence of depression using a valid screening tool, Demonstrates appropriate interaction with others, Engages in self-care behaviors, Maximizes coping skills, Positive support group  Patient Treatment Goal: Pt will maintain PHQ-9 <5 by the end of the program  Goal Status: In progress    Tobacco Cessation Assessment  Smokeless Tobacco Use: No              Nutrition Assessment  Stages of Change: Action  Nutrition Assessment Tool: Rate Your Plate  Current Diet: Heart healthy diet  Barriers to Heart Healthy Diet: Limitied knowledge  Alcohol: Yes  Type: Beer, Liquor  Amount (Drinks Per Week): 1-2 (1-2 days week)  Height : 175.3 cm (5' 9\")  Weight : 68.5 kg (151 lb)  BMI: 22.35  Waist Circumference (In): 35.5\"              HgbA1c: 5.8    HgbA1c Date: 06/22/22    BG at Home: No    Diabetes Med(s):

## 2024-10-02 ENCOUNTER — HOSPITAL ENCOUNTER (OUTPATIENT)
Facility: HOSPITAL | Age: 79
Setting detail: RECURRING SERIES
Discharge: HOME | End: 2024-10-05
Payer: MEDICARE

## 2024-10-02 VITALS — BODY MASS INDEX: 22.15 KG/M2 | WEIGHT: 150 LBS

## 2024-10-02 PROCEDURE — 93798 PHYS/QHP OP CAR RHAB W/ECG: CPT

## 2024-10-02 ASSESSMENT — EXERCISE STRESS TEST
PEAK_HR: 92
PEAK_BP: 123/68

## 2024-10-04 ENCOUNTER — HOSPITAL ENCOUNTER (OUTPATIENT)
Facility: HOSPITAL | Age: 79
Setting detail: RECURRING SERIES
Discharge: HOME | End: 2024-10-07
Payer: MEDICARE

## 2024-10-04 VITALS — WEIGHT: 152 LBS | BODY MASS INDEX: 22.45 KG/M2

## 2024-10-04 PROCEDURE — 93798 PHYS/QHP OP CAR RHAB W/ECG: CPT

## 2024-10-04 ASSESSMENT — EXERCISE STRESS TEST
PEAK_BP: 119/66
PEAK_RPE: 3
PEAK_HR: 95

## 2024-10-07 ENCOUNTER — HOSPITAL ENCOUNTER (OUTPATIENT)
Facility: HOSPITAL | Age: 79
Setting detail: RECURRING SERIES
Discharge: HOME | End: 2024-10-10
Payer: MEDICARE

## 2024-10-07 VITALS — BODY MASS INDEX: 22.09 KG/M2 | WEIGHT: 149.6 LBS

## 2024-10-07 PROCEDURE — 93798 PHYS/QHP OP CAR RHAB W/ECG: CPT

## 2024-10-07 ASSESSMENT — EXERCISE STRESS TEST
PEAK_BP: 118/70
PEAK_HR: 89
PEAK_METS: 3.2

## 2024-10-09 ENCOUNTER — HOSPITAL ENCOUNTER (OUTPATIENT)
Facility: HOSPITAL | Age: 79
Setting detail: RECURRING SERIES
Discharge: HOME | End: 2024-10-12
Payer: MEDICARE

## 2024-10-09 VITALS — BODY MASS INDEX: 22.24 KG/M2 | WEIGHT: 150.6 LBS

## 2024-10-09 PROCEDURE — 93798 PHYS/QHP OP CAR RHAB W/ECG: CPT

## 2024-10-09 ASSESSMENT — EXERCISE STRESS TEST
PEAK_BP: 114/57
PEAK_METS: 3.2
PEAK_HR: 89
PEAK_RPE: 3

## 2024-10-11 ENCOUNTER — HOSPITAL ENCOUNTER (OUTPATIENT)
Facility: HOSPITAL | Age: 79
Setting detail: RECURRING SERIES
Discharge: HOME | End: 2024-10-14
Payer: MEDICARE

## 2024-10-11 VITALS — BODY MASS INDEX: 22.3 KG/M2 | WEIGHT: 151 LBS

## 2024-10-11 PROCEDURE — 93798 PHYS/QHP OP CAR RHAB W/ECG: CPT

## 2024-10-11 ASSESSMENT — EXERCISE STRESS TEST
PEAK_METS: 3.2
PEAK_RPE: 3
PEAK_BP: 109/69
PEAK_HR: 86

## 2024-10-14 ENCOUNTER — HOSPITAL ENCOUNTER (OUTPATIENT)
Facility: HOSPITAL | Age: 79
Setting detail: RECURRING SERIES
Discharge: HOME | End: 2024-10-17
Payer: MEDICARE

## 2024-10-14 VITALS — BODY MASS INDEX: 22.03 KG/M2 | WEIGHT: 149.2 LBS

## 2024-10-14 PROCEDURE — 93798 PHYS/QHP OP CAR RHAB W/ECG: CPT

## 2024-10-14 ASSESSMENT — EXERCISE STRESS TEST
PEAK_HR: 86
PEAK_RPE: 3
PEAK_BP: 112/57

## 2024-10-16 ENCOUNTER — HOSPITAL ENCOUNTER (OUTPATIENT)
Facility: HOSPITAL | Age: 79
Setting detail: RECURRING SERIES
Discharge: HOME | End: 2024-10-19
Payer: MEDICARE

## 2024-10-16 VITALS — BODY MASS INDEX: 21.68 KG/M2 | WEIGHT: 146.8 LBS

## 2024-10-16 PROCEDURE — 93798 PHYS/QHP OP CAR RHAB W/ECG: CPT

## 2024-10-16 ASSESSMENT — EXERCISE STRESS TEST
PEAK_RPE: 3
PEAK_HR: 85
PEAK_BP: 117/71
PEAK_METS: 3.2

## 2024-10-18 ENCOUNTER — HOSPITAL ENCOUNTER (OUTPATIENT)
Facility: HOSPITAL | Age: 79
Setting detail: RECURRING SERIES
Discharge: HOME | End: 2024-10-21
Payer: MEDICARE

## 2024-10-18 VITALS — BODY MASS INDEX: 22.18 KG/M2 | WEIGHT: 150.2 LBS

## 2024-10-18 PROCEDURE — 93798 PHYS/QHP OP CAR RHAB W/ECG: CPT

## 2024-10-18 ASSESSMENT — EXERCISE STRESS TEST
PEAK_RPE: 3.5
PEAK_BP: 114/69
PEAK_HR: 81

## 2024-10-21 ENCOUNTER — HOSPITAL ENCOUNTER (OUTPATIENT)
Facility: HOSPITAL | Age: 79
Setting detail: RECURRING SERIES
Discharge: HOME | End: 2024-10-24
Payer: MEDICARE

## 2024-10-21 VITALS — BODY MASS INDEX: 21.74 KG/M2 | WEIGHT: 147.2 LBS

## 2024-10-21 PROCEDURE — 93798 PHYS/QHP OP CAR RHAB W/ECG: CPT

## 2024-10-21 ASSESSMENT — EXERCISE STRESS TEST
PEAK_HR: 96
PEAK_BP: 120/69
PEAK_METS: 3.2
PEAK_RPE: 4

## 2024-10-23 ENCOUNTER — HOSPITAL ENCOUNTER (OUTPATIENT)
Facility: HOSPITAL | Age: 79
Setting detail: RECURRING SERIES
Discharge: HOME | End: 2024-10-26
Payer: MEDICARE

## 2024-10-23 VITALS — WEIGHT: 149 LBS | BODY MASS INDEX: 22 KG/M2

## 2024-10-23 PROCEDURE — 93798 PHYS/QHP OP CAR RHAB W/ECG: CPT

## 2024-10-23 ASSESSMENT — EXERCISE STRESS TEST
PEAK_METS: 3.2
PEAK_HR: 91
PEAK_BP: 108/66
PEAK_RPE: 3

## 2024-10-25 ENCOUNTER — HOSPITAL ENCOUNTER (OUTPATIENT)
Facility: HOSPITAL | Age: 79
Setting detail: RECURRING SERIES
Discharge: HOME | End: 2024-10-28
Payer: MEDICARE

## 2024-10-25 VITALS — WEIGHT: 148 LBS | BODY MASS INDEX: 21.86 KG/M2

## 2024-10-25 PROCEDURE — 93798 PHYS/QHP OP CAR RHAB W/ECG: CPT

## 2024-10-25 ASSESSMENT — EXERCISE STRESS TEST
PEAK_BP: 118/68
PEAK_RPE: 3
PEAK_HR: 80

## 2024-10-28 ENCOUNTER — HOSPITAL ENCOUNTER (OUTPATIENT)
Facility: HOSPITAL | Age: 79
Setting detail: RECURRING SERIES
Discharge: HOME | End: 2024-10-31
Payer: MEDICARE

## 2024-10-28 VITALS — WEIGHT: 150 LBS | BODY MASS INDEX: 22.15 KG/M2

## 2024-10-28 PROCEDURE — 93798 PHYS/QHP OP CAR RHAB W/ECG: CPT

## 2024-10-28 ASSESSMENT — EXERCISE STRESS TEST
PEAK_BP: 119/63
PEAK_HR: 110
PEAK_METS: 3.2
PEAK_RPE: 4

## 2024-10-29 ASSESSMENT — NEW YORK HEART ASSOCIATION (NYHA) CLASSIFICATION: NYHA FUNCTIONAL CLASS: NO NYHA CLASS OR UNABLE TO DETERMINE

## 2024-10-29 NOTE — PROGRESS NOTES
Home: No    Diabetes Med(s): Metformin    Diabetes Med(s) Change: No    BG in Range: Yes    Random B    Lipid Management Assessment  Stages of Change: Action  Date Lipids Drawn:  (No recent results)    Weight Management Assessment  Stages of Change: Action  Height : 175.3 cm (5' 9\")  Weight : 68 kg (150 lb)  BMI: 22.2  Waist Circumference (In): 35.5\"  Current Weight Loss/Gain Strategies: Adhere to heart healthy diet and exercise consistently    Nutrition Intervention  Dietitian Consult: No  Staff/Patient Discussion: Yes  Diabetes Education Referral: No  Lipid Clinic Referral: No    Nutrition Education  Resource Information Provided: Yes  Education: DM & CAD relationship, Low saturated fat diet, Low sodium diet, Limit added sugars, Overall healthy eating pattern that emphasizes vegetables, fruits, wholegrains, healthy proteins and non-tropical oils, Reduced calorie/portion controlled diet    Nutrition Goals  Patient Target Goals: LDL-C less than 70 and non-HDL-C <100 for those with ASCVD, Triglycerides less than 150, HbA1C less than 7 percent for those with diabetes, Waist less than 40 inches males, 35 for females, Weight loss of 5-10%  Patient Treatment Goal: Pt will increase muscle mass and improve engery level by the end of the program  Goal Status: In progress         Education  Learning Barrier: Ready to learn  Education Schedule Given: Yes    Retired, Read Only Education Intervention  Education Schedule Given: Yes    Patient Education   Education: CAD, Risk factors, Med compliance, Cardiac A&P, Signs/Symptoms of angina, Sexuality  Hypertension: Yes  Hypertension Controlled: Yes  Is BP WDL: Yes  Med(s) Change: No  BP Meds: Nitro, Eliquis, Lipitor, Metoprolol, Plavix         Retired, Read Only Staff Treatment Goals  Goal Status: In progress             Exercise Log    Rehab Common Questions  Any Problems or Changes Since Your Last Visit : Denies  Any Symptoms While Exercising: 3/10 SOB  Psychosocial/Stress

## 2024-10-30 ENCOUNTER — HOSPITAL ENCOUNTER (OUTPATIENT)
Facility: HOSPITAL | Age: 79
Setting detail: RECURRING SERIES
Discharge: HOME | End: 2024-11-02
Payer: MEDICARE

## 2024-10-30 VITALS — BODY MASS INDEX: 21.56 KG/M2 | WEIGHT: 146 LBS

## 2024-10-30 PROCEDURE — 93798 PHYS/QHP OP CAR RHAB W/ECG: CPT

## 2024-10-30 ASSESSMENT — EXERCISE STRESS TEST
PEAK_METS: 3.1
PEAK_HR: 114
PEAK_RPE: 4
PEAK_BP: 137/74

## 2024-11-01 ENCOUNTER — HOSPITAL ENCOUNTER (OUTPATIENT)
Facility: HOSPITAL | Age: 79
Setting detail: RECURRING SERIES
Discharge: HOME | End: 2024-11-04
Payer: MEDICARE

## 2024-11-01 VITALS — WEIGHT: 148 LBS | BODY MASS INDEX: 21.86 KG/M2

## 2024-11-01 PROCEDURE — 93798 PHYS/QHP OP CAR RHAB W/ECG: CPT

## 2024-11-01 ASSESSMENT — EXERCISE STRESS TEST
PEAK_HR: 81
PEAK_METS: 2.6
PEAK_RPE: 3
PEAK_BP: 123/62

## 2024-11-04 ENCOUNTER — HOSPITAL ENCOUNTER (OUTPATIENT)
Facility: HOSPITAL | Age: 79
Setting detail: RECURRING SERIES
Discharge: HOME | End: 2024-11-07
Payer: MEDICARE

## 2024-11-04 VITALS — BODY MASS INDEX: 21.65 KG/M2 | WEIGHT: 146.6 LBS

## 2024-11-04 PROCEDURE — 93798 PHYS/QHP OP CAR RHAB W/ECG: CPT

## 2024-11-04 ASSESSMENT — EXERCISE STRESS TEST
PEAK_HR: 85
PEAK_BP: 118/65
PEAK_RPE: 4

## 2024-11-06 ENCOUNTER — HOSPITAL ENCOUNTER (OUTPATIENT)
Facility: HOSPITAL | Age: 79
Setting detail: RECURRING SERIES
Discharge: HOME | End: 2024-11-09
Payer: MEDICARE

## 2024-11-06 VITALS — BODY MASS INDEX: 21.75 KG/M2 | WEIGHT: 147.3 LBS

## 2024-11-06 PROCEDURE — 93798 PHYS/QHP OP CAR RHAB W/ECG: CPT

## 2024-11-06 ASSESSMENT — PATIENT HEALTH QUESTIONNAIRE - PHQ9
10. IF YOU CHECKED OFF ANY PROBLEMS, HOW DIFFICULT HAVE THESE PROBLEMS MADE IT FOR YOU TO DO YOUR WORK, TAKE CARE OF THINGS AT HOME, OR GET ALONG WITH OTHER PEOPLE: NOT DIFFICULT AT ALL
9. THOUGHTS THAT YOU WOULD BE BETTER OFF DEAD, OR OF HURTING YOURSELF: NOT AT ALL
5. POOR APPETITE OR OVEREATING: SEVERAL DAYS
2. FEELING DOWN, DEPRESSED OR HOPELESS: NOT AT ALL
1. LITTLE INTEREST OR PLEASURE IN DOING THINGS: NOT AT ALL
4. FEELING TIRED OR HAVING LITTLE ENERGY: SEVERAL DAYS
7. TROUBLE CONCENTRATING ON THINGS, SUCH AS READING THE NEWSPAPER OR WATCHING TELEVISION: NOT AT ALL
SUM OF ALL RESPONSES TO PHQ QUESTIONS 1-9: 4
SUM OF ALL RESPONSES TO PHQ9 QUESTIONS 1 & 2: 0
SUM OF ALL RESPONSES TO PHQ QUESTIONS 1-9: 4
6. FEELING BAD ABOUT YOURSELF - OR THAT YOU ARE A FAILURE OR HAVE LET YOURSELF OR YOUR FAMILY DOWN: NOT AT ALL
3. TROUBLE FALLING OR STAYING ASLEEP: SEVERAL DAYS
SUM OF ALL RESPONSES TO PHQ QUESTIONS 1-9: 4
SUM OF ALL RESPONSES TO PHQ QUESTIONS 1-9: 4
8. MOVING OR SPEAKING SO SLOWLY THAT OTHER PEOPLE COULD HAVE NOTICED. OR THE OPPOSITE, BEING SO FIGETY OR RESTLESS THAT YOU HAVE BEEN MOVING AROUND A LOT MORE THAN USUAL: SEVERAL DAYS

## 2024-11-06 ASSESSMENT — NEW YORK HEART ASSOCIATION (NYHA) CLASSIFICATION: NYHA FUNCTIONAL CLASS: NO NYHA CLASS OR UNABLE TO DETERMINE

## 2024-11-06 ASSESSMENT — EXERCISE STRESS TEST
PEAK_BP: 119/53
PEAK_HR: 94
PEAK_RPE: 2.5
PEAK_METS: 2

## 2024-11-06 NOTE — PROGRESS NOTES
CARDIAC REHAB DISCHARGE NOTE FOR REVIEW AND SIGNATURE      Patient: Wiliam Richardson (78 y.o. male)  Date: 11/6/2024  Primary Diagnosis: Coronary angioplasty status [Z98.61]      Mr. Richardson has completed Phase II of Cardiac Rehab; having attended 36 sessions from 8/5/2024-11/6/2024.    Patient is interested in maintaining optimal health and will work with his referring physician Tammie Mcdermott. He has improved endurance and stamina through regular exercise during the program. Weight loss since SOC:2.7 lbs and 1 inches from waist. Blood pressure at discharge is not WNL.    Patient's [place] level has not improved. PHQ-9 Depression Inventory score has not improved. Pt's MET level has not increased during 6-Minute Walk Test. These scores/results have been reviewed with patient.    Patient is progressing and has met his re-certification goals. Patient plans to continue exercising upon discharge (at home, gym, etc). Patient education completed in collaboration with RN/EP for revised goals upon discharge, to include: recommended cardio equipment; light weights; and walking 3-5 x week.    Outcomes Assessment Initial 30 day 60 day 90 day D/C   Duration     6 50  49  52  6   MET     2 2.6  3.2  3.2  2   RPE  1.5 4  3.5  4  6   Self-report Home Exercise   Min/per week     10-15 minutes/ 2-3x week  20 minutes/ 3x week  less than 30 minutes/ 1-2 week  less than 30 minutes/ 1-2 week  less than 30 minutes/ 1-2 week      Discharge Recommendations:   Wiliam Richardson understand the benefits of exercising at least 150 minutes a week, maintaining a heart healthy diet, and monitoring BPs twice a day. Patient received water bottle, heart healthy diet cookbook, and at home exercises.    Pt will return to pulmonary rehab.    Cardiac ITP    Treatment Diagnosis  Treatment Diagnosis 1: PCI (PTCA)  PCI Date: 06/25/24  Treatment Diagnosis 2: Angina  Referral Date: 06/25/24  NYHA Heart Failure Classification: No NYHA class or unable to

## 2024-11-07 DIAGNOSIS — J84.10 PULMONARY FIBROSIS (HCC): Primary | ICD-10-CM

## 2024-11-14 ENCOUNTER — HOSPITAL ENCOUNTER (OUTPATIENT)
Facility: HOSPITAL | Age: 79
Setting detail: RECURRING SERIES
Discharge: HOME | End: 2024-11-17
Payer: MEDICARE

## 2024-11-14 PROCEDURE — 94618 PULMONARY STRESS TESTING: CPT

## 2024-11-14 PROCEDURE — G0237 THERAPEUTIC PROCD STRG ENDUR: HCPCS

## 2024-11-14 ASSESSMENT — LIFESTYLE VARIABLES
SMOKELESS_TOBACCO: NO
ALCOHOL_USE: YES

## 2024-11-14 ASSESSMENT — PATIENT HEALTH QUESTIONNAIRE - PHQ9
SUM OF ALL RESPONSES TO PHQ QUESTIONS 1-9: 6
8. MOVING OR SPEAKING SO SLOWLY THAT OTHER PEOPLE COULD HAVE NOTICED. OR THE OPPOSITE, BEING SO FIGETY OR RESTLESS THAT YOU HAVE BEEN MOVING AROUND A LOT MORE THAN USUAL: NOT AT ALL
9. THOUGHTS THAT YOU WOULD BE BETTER OFF DEAD, OR OF HURTING YOURSELF: NOT AT ALL
1. LITTLE INTEREST OR PLEASURE IN DOING THINGS: SEVERAL DAYS
4. FEELING TIRED OR HAVING LITTLE ENERGY: MORE THAN HALF THE DAYS
7. TROUBLE CONCENTRATING ON THINGS, SUCH AS READING THE NEWSPAPER OR WATCHING TELEVISION: NOT AT ALL
5. POOR APPETITE OR OVEREATING: SEVERAL DAYS
10. IF YOU CHECKED OFF ANY PROBLEMS, HOW DIFFICULT HAVE THESE PROBLEMS MADE IT FOR YOU TO DO YOUR WORK, TAKE CARE OF THINGS AT HOME, OR GET ALONG WITH OTHER PEOPLE: SOMEWHAT DIFFICULT
SUM OF ALL RESPONSES TO PHQ QUESTIONS 1-9: 6
6. FEELING BAD ABOUT YOURSELF - OR THAT YOU ARE A FAILURE OR HAVE LET YOURSELF OR YOUR FAMILY DOWN: NOT AT ALL
2. FEELING DOWN, DEPRESSED OR HOPELESS: NOT AT ALL
SUM OF ALL RESPONSES TO PHQ9 QUESTIONS 1 & 2: 1
3. TROUBLE FALLING OR STAYING ASLEEP: MORE THAN HALF THE DAYS

## 2024-11-14 ASSESSMENT — EXERCISE STRESS TEST
PEAK_BP: 121/72
PEAK_HR: 70
PEAK_METS: 2.15
PEAK_RPE: 3

## 2024-11-14 ASSESSMENT — PULMONARY FUNCTION TESTS
FVC (LITERS): 50
FEV1 (%PREDICTED): 61
FEV1/FVC: 117

## 2024-11-14 ASSESSMENT — EJECTION FRACTION: EF_VALUE: 55

## 2024-11-14 NOTE — PROGRESS NOTES
Initial Patient Interview:    Diagnosis:   OtherPulmonary Fibrosis     Primary Care Physician: [place]    Pulmonologist: Gunnar Stewart    Cardiologist: LEONIDAS Mcdermott    How many times have you been hospitalized or gone to the Emergency Department in the past 12 months? Yes     How many of those times were due to your lung condition?1 (PHTN/Stent)    Date of recent admission/ED visit:24    Number of Lung infections in past 12 months? No    Any history of chest/lung injury or surgery?yes   Yes, CABG 2020 [facility]    Any upcoming surgeries scheduled:no    Any of the following diagnoses: CAD, CABG , Previous PCI , and Sleep Apnea    Family History of Heart or Lung disease: yes   Brother COPD ( 70), Sister COPD, Brother Cardiac    Do you smoke or currently live with a smoker? no  Have you ever smoked or lived with a smoker? yes   7 years  How much and for how long? 7 years (2ppd)    What is your home respiratory medication routine:   Controller Medications: Pirfenidone   Rescue Medications: Albuterol MDI  Are you on home Oxygen:  Yes   Details: 2L pulse dose Port Concentrator PRN (with exercise/exertion) Sleep 1L Bled into CPAP    Home medical equipment:    Any allergies to food or medication: NKA      Social  History & Family Component    Living Situation:    LIVES: alone      Pets in the home? no    Does patient have family/friends able to provide support  yes   LIBRA    What type of home does patient live in? 2 story+ (if stairs how many) (13 interior) (4 exterior)    Does patient have a yard?  yes,     If yes, can patient care for yard?  no   If no, does patient require assistance to care for yard?  yes, Paid service    Transportation Resources:  Can patient drive themselves?  yes,    If no, who is primary ?          Services/[place]/Visual Impaired: yes,    If yes, describe: Glasses    Occupation: Retired (. , )    Education/Grade Level:

## 2024-11-14 NOTE — PROGRESS NOTES
Wiliam Richardson #[id number] (CSN:[id number]) (78 y.o. M) (Adm: 11/14/24)  [facility]  Cardiac ITP    Flowsheet Row CARDIO PULMONARY REHAB from 11/14/2024 in [facility] PULMONARY REHAB   Treatment Diagnosis    Treatment Diagnosis 1 Pulmonary fibrosis   Treatment Diagnosis 2 --   Referral Date 11/04/24   Significant Cardiovascular History --   Co-morbidities Diabetes, Pulmonary disease  [T2DM]   Oxygen Saturation / Titration    Stages of Change --   Oxygen Assessment    Stages of Change --   Oxygen Type Nasal canula   Oxygen Compliant Yes   O2 Flow Rate (l/min) - Rest 0 l/min   O2 Flow Rate (l/min) - Exercise 3 l/min   O2 Flow Rate (l/min) - Sleep 1 l/min   Breath Sounds --   O2 Sat Greater Than 90% Yes   Retired, Read Only Patients Liter Flow --   Individual Treatment Plan    ITP Visit Type Initial assessment   1st Date of Exercise 11/18/24   ITP Next Review Date 12/12/24  [Pt completed 36 sessions and has graduated program.]   Visit #/Total Visits 1/36   EF% 55 %   FVC (Liters) 50 liters   FEV1 (%PRED) 61   FEV1/   DLCO (mL/mmHg sec) 39 ml/mmHg sec   Risk Stratification Low   Pulmonary ITP Exercise, Psychosocial, Nutrition, Education   Exercise    Wolf Total Score 0   Stages of Change Action   Exercise Test Six minute walk test   Distance Calculated in ft   Distance (Feet-Actual) --   Distance (miles) 0.15   Distance (Feet-Calculated) 792 ft   Distance (meters) --   Peak HR 70   Peak /72   Peak RPE 3   Peak Mets 2.15   SpO2 88-96   O2 Flow Rate (l/min) 2 l/min   Stops 0   SPO2 Range 88-96   DASI Total Score --   BMI (Calculated) --   FEV1 (%PRED) 61   SHANI Total Score --   Exercise Prescription    Mode Treadmill, Bike, Stepper, Ergometer, Resistance training   Frequency per week 2-3   Duration Per Session 60   Intensity METS       2.15   Progression Progressing well   SpO2 --   O2 Device Nasal cannula   O2 Flow Rate (l/min) 2 l/min   Retired, Read Only FIO2 (%) --   O2 Temperature --   Comments --   Symptoms with

## 2024-11-18 ENCOUNTER — HOSPITAL ENCOUNTER (OUTPATIENT)
Facility: HOSPITAL | Age: 79
Setting detail: RECURRING SERIES
Discharge: HOME | End: 2024-11-21
Payer: MEDICARE

## 2024-11-18 PROCEDURE — G0239 OTH RESP PROC, GROUP: HCPCS

## 2024-11-18 PROCEDURE — G0237 THERAPEUTIC PROCD STRG ENDUR: HCPCS

## 2024-11-18 NOTE — PROGRESS NOTES
Pulmonary/RT Note    Visit #        2/36       Wiliam Richardson is a 78 y.o. male presented today for pulmonary rehab. He states that there have been no changes in medication or health since the last visit.          He has a target heart rate of 103-118. He tolerated the exercise session well and has a pain level of 0. Patient states RPE for session today was 14 and RPD was a 3. Today the pt did the following.        Nustep: 10 min  Arm bike: 10 min  Wallking:10  min  Breathing retraining:10 min  Recovery and Monitoring:15 min             Physician available for emergencies: Gunnar HUGGINS, RT 11/18/2024 11:16 AM

## 2024-11-20 ENCOUNTER — HOSPITAL ENCOUNTER (OUTPATIENT)
Facility: HOSPITAL | Age: 79
Setting detail: RECURRING SERIES
Discharge: HOME | End: 2024-11-23
Payer: MEDICARE

## 2024-11-20 PROCEDURE — G0237 THERAPEUTIC PROCD STRG ENDUR: HCPCS

## 2024-11-20 NOTE — PROGRESS NOTES
Pulmonary/RT Note    Visit #        3/36       Wiliam Richardson is a 78 y.o. male presented today for pulmonary rehab. He states that there have been no changes in medication or health since the last visit.          He has a target heart rate of 103-118. He tolerated the exercise session well and has a pain level of 0. Patient states RPE for session today was 3 and RPD was a 0. Today the pt did the following.    Nustep:15 min  Wallking: 15 min  Breathing retraining: 15 min  Recovery and Monitoring:15 min             Physician available for emergencies:     KIMBERLY HUGGINS, RT 11/20/2024 1:48 PM

## 2024-11-22 ENCOUNTER — HOSPITAL ENCOUNTER (OUTPATIENT)
Facility: HOSPITAL | Age: 79
Setting detail: RECURRING SERIES
Discharge: HOME | End: 2024-11-25
Payer: MEDICARE

## 2024-11-22 PROCEDURE — G0237 THERAPEUTIC PROCD STRG ENDUR: HCPCS

## 2024-11-22 NOTE — PROGRESS NOTES
Pulmonary/RT Note    Visit #        4/36       Wiliam Richardson is a 78 y.o. male presented today for pulmonary rehab. He states that there have been no changes in medication or health since the last visit.          He has a target heart rate of 103-118. He tolerated the exercise session well and has a pain level of 0. Patient states RPE for session today was 3 and RPD was a 0. Today the pt did the following.        Nustep:15 min  Wallking: 15 min  Breathing retraining: 15 mi  Recovery and Monitoring:15 min             Physician available for emergencies: Casa HUGGINS, RT 11/22/2024 1:23 PM

## 2024-11-25 ENCOUNTER — HOSPITAL ENCOUNTER (OUTPATIENT)
Facility: HOSPITAL | Age: 79
Setting detail: RECURRING SERIES
Discharge: HOME | End: 2024-11-28
Payer: MEDICARE

## 2024-11-25 PROCEDURE — G0237 THERAPEUTIC PROCD STRG ENDUR: HCPCS

## 2024-11-25 NOTE — PROGRESS NOTES
Pulmonary/RT Note    Visit #        5/36       Wiliam Richardson is a 78 y.o. male presented today for pulmonary rehab. He states that there have been no changes in medication or health since the last visit.          He has a target heart rate of 103-118. He tolerated the exercise session well and has a pain level of 1. Patient states RPE for session today was 14 and RPD was a 3. Today the pt did the following.        Nustep: 10 min  Arm bike: 10 min  Wallking: 10 min  Breathing retraining:15 min  Recovery and Monitoring:15 min             Physician available for emergencies:     Surya HUGGINS, RT 11/25/2024 12:17 PM

## 2024-11-27 ENCOUNTER — HOSPITAL ENCOUNTER (OUTPATIENT)
Facility: HOSPITAL | Age: 79
Setting detail: RECURRING SERIES
Discharge: HOME | End: 2024-11-30
Payer: MEDICARE

## 2024-11-27 PROCEDURE — G0237 THERAPEUTIC PROCD STRG ENDUR: HCPCS

## 2024-11-27 NOTE — PROGRESS NOTES
Pulmonary/RT Note    Visit #        6/36       Wiliam Richardson is a 78 y.o. male presented today for pulmonary rehab. He states that there have been no changes in medication or health since the last visit.          He has a target heart rate of 103-118. He tolerated the exercise session well and has a pain level of 0. Patient states RPE for session today was 3 and RPD was a 2. Today the pt did the following.        Nustep:10 min  Arm bike: 10 min  Wallking: 10 min  Breathing retraining:15 min  Recovery and Monitoring:15 min             Physician available for emergencies: Surya HUGGINS, RT 11/27/2024 12:15 PM

## 2024-11-29 ENCOUNTER — APPOINTMENT (OUTPATIENT)
Facility: HOSPITAL | Age: 79
End: 2024-11-29
Payer: MEDICARE

## 2024-12-02 ENCOUNTER — HOSPITAL ENCOUNTER (OUTPATIENT)
Facility: HOSPITAL | Age: 79
Setting detail: RECURRING SERIES
Discharge: HOME | End: 2024-12-05
Payer: MEDICARE

## 2024-12-02 PROCEDURE — G0239 OTH RESP PROC, GROUP: HCPCS

## 2024-12-02 PROCEDURE — G0237 THERAPEUTIC PROCD STRG ENDUR: HCPCS

## 2024-12-02 NOTE — PROGRESS NOTES
Pulmonary/RT Note    Visit #        7/36       Wiliam Richardson is a 78 y.o. male presented today for pulmonary rehab. He states that there have been no changes in medication or health since the last visit.          He has a target heart rate of 103-118. He tolerated the exercise session well and has a pain level of 0. Patient states RPE for session today was 3 and RPD was a 3. Today the pt did the following.        Nustep:10 min  Arm bike: 10 min  Wallking: 10 min  Breathing retraining:15 min  Recovery and Monitoring:15 min             Physician available for emergencies: ДМИТРИЙ HUGGINS, RT 12/2/2024 11:02 AM

## 2024-12-04 ENCOUNTER — HOSPITAL ENCOUNTER (OUTPATIENT)
Facility: HOSPITAL | Age: 79
Setting detail: RECURRING SERIES
Discharge: HOME | End: 2024-12-07
Payer: MEDICARE

## 2024-12-04 PROCEDURE — G0237 THERAPEUTIC PROCD STRG ENDUR: HCPCS

## 2024-12-04 NOTE — PROGRESS NOTES
Pulmonary/RT Note    Visit #        8/36       Wiliam Richardson is a 78 y.o. male presented today for pulmonary rehab. He states that there have been no changes in medication or health since the last visit.          He has a target heart rate of 103-118. He tolerated the exercise session well and has a pain level of 1. Patient states RPE for session today was 14 and RPD was a 3. Today the pt did the following.        Nustep: 10 min  Arm bike: 10 min  Wallking: 10 min  Resistance bands:  min   Breathing retraining:15 min  Bodyweight:  min  Recovery and Monitoring:15 min             Physician available for emergencies: ДМИТРИЙ HUGGINS, RT 12/4/2024 11:18 AM

## 2024-12-06 ENCOUNTER — HOSPITAL ENCOUNTER (OUTPATIENT)
Facility: HOSPITAL | Age: 79
Setting detail: RECURRING SERIES
Discharge: HOME | End: 2024-12-09
Payer: MEDICARE

## 2024-12-06 PROCEDURE — G0239 OTH RESP PROC, GROUP: HCPCS

## 2024-12-06 PROCEDURE — G0237 THERAPEUTIC PROCD STRG ENDUR: HCPCS

## 2024-12-06 NOTE — PROGRESS NOTES
Pulmonary/RT Note    Visit #        9/36       Wiliam Richardson is a 78 y.o. male presented today for pulmonary rehab. He states that there have been no changes in medication or health since the last visit.          He has a target heart rate of 103-118. He tolerated the exercise session well and has a pain level of 1. Patient states RPE for session today was 14 and RPD was a 3. Today the pt did the following.        Nustep:10 min  Arm bike:  min  Wallking: 10 min  Resistance bands:  min   Breathing retraining:15 min  Bodyweight:  min  Recovery and Monitoring:15 min             Physician available for emergencies:           ELI HUGGINS, RT 12/6/2024 11:16 AM

## 2024-12-09 ENCOUNTER — HOSPITAL ENCOUNTER (OUTPATIENT)
Facility: HOSPITAL | Age: 79
Setting detail: RECURRING SERIES
Discharge: HOME | End: 2024-12-12
Payer: MEDICARE

## 2024-12-09 PROCEDURE — G0237 THERAPEUTIC PROCD STRG ENDUR: HCPCS

## 2024-12-09 PROCEDURE — G0239 OTH RESP PROC, GROUP: HCPCS

## 2024-12-09 ASSESSMENT — PATIENT HEALTH QUESTIONNAIRE - PHQ9
1. LITTLE INTEREST OR PLEASURE IN DOING THINGS: SEVERAL DAYS
SUM OF ALL RESPONSES TO PHQ QUESTIONS 1-9: 4
6. FEELING BAD ABOUT YOURSELF - OR THAT YOU ARE A FAILURE OR HAVE LET YOURSELF OR YOUR FAMILY DOWN: NOT AT ALL
SUM OF ALL RESPONSES TO PHQ QUESTIONS 1-9: 4
SUM OF ALL RESPONSES TO PHQ QUESTIONS 1-9: 4
7. TROUBLE CONCENTRATING ON THINGS, SUCH AS READING THE NEWSPAPER OR WATCHING TELEVISION: NOT AT ALL
SUM OF ALL RESPONSES TO PHQ QUESTIONS 1-9: 4
3. TROUBLE FALLING OR STAYING ASLEEP: NOT AT ALL
5. POOR APPETITE OR OVEREATING: SEVERAL DAYS
SUM OF ALL RESPONSES TO PHQ9 QUESTIONS 1 & 2: 2
9. THOUGHTS THAT YOU WOULD BE BETTER OFF DEAD, OR OF HURTING YOURSELF: NOT AT ALL
10. IF YOU CHECKED OFF ANY PROBLEMS, HOW DIFFICULT HAVE THESE PROBLEMS MADE IT FOR YOU TO DO YOUR WORK, TAKE CARE OF THINGS AT HOME, OR GET ALONG WITH OTHER PEOPLE: SOMEWHAT DIFFICULT
8. MOVING OR SPEAKING SO SLOWLY THAT OTHER PEOPLE COULD HAVE NOTICED. OR THE OPPOSITE, BEING SO FIGETY OR RESTLESS THAT YOU HAVE BEEN MOVING AROUND A LOT MORE THAN USUAL: NOT AT ALL
4. FEELING TIRED OR HAVING LITTLE ENERGY: SEVERAL DAYS
2. FEELING DOWN, DEPRESSED OR HOPELESS: SEVERAL DAYS

## 2024-12-09 NOTE — PROGRESS NOTES
Pulmonary/RT Note    Visit #        12/36       Wiliam Richardson is a 79 y.o. male presented today for pulmonary rehab. He states that there have been no changes in medication or health since the last visit.          He has a target heart rate of 119-136. He tolerated the exercise session well and has a pain level of 6. Patient states RPE for session today was 14 and RPD was a 3. Today the pt did the following.        Nustep: 10 min  Arm bike:  10 min  Wallking: 10 min  Breathing retraining:15 min  Recovery and Monitoring:15 min             Physician available for emergencies: Gunnar HUGGINS, RT 12/9/2024 11:29 AM      Periodic Check-in: 12/09/2024    What do you feel in going well for you in CardioPulmonary Rehab: Availability of equipment      What areas are you still seeking improvement: Shortness of breath    Do you have any additional goals you would like to work on: none      Do you currently exercise outside of Pulmonary Rehab?  Type: dumb bell  Frequency:  2-3 neftali  Duration:  30 min  Is there any educational material that I can provide for you today: no

## 2024-12-11 ENCOUNTER — HOSPITAL ENCOUNTER (OUTPATIENT)
Facility: HOSPITAL | Age: 79
Setting detail: RECURRING SERIES
Discharge: HOME | End: 2024-12-14
Payer: MEDICARE

## 2024-12-11 PROCEDURE — G0239 OTH RESP PROC, GROUP: HCPCS

## 2024-12-11 PROCEDURE — G0237 THERAPEUTIC PROCD STRG ENDUR: HCPCS

## 2024-12-11 ASSESSMENT — LIFESTYLE VARIABLES
ALCOHOL_USE: YES
SMOKELESS_TOBACCO: NO
ALCOHOL_USE: YES
SMOKELESS_TOBACCO: NO

## 2024-12-11 ASSESSMENT — EXERCISE STRESS TEST
PEAK_BP: 121/72
PEAK_BP: 121/72
PEAK_METS: 2.15
PEAK_METS: 2.15
PEAK_RPE: 3
PEAK_HR: 70
PEAK_RPE: 3

## 2024-12-11 ASSESSMENT — PULMONARY FUNCTION TESTS
FVC (LITERS): 50
FVC (LITERS): 50
FEV1 (%PREDICTED): 61
FEV1/FVC: 117
FEV1 (%PREDICTED): 61
FEV1/FVC: 117

## 2024-12-11 ASSESSMENT — EJECTION FRACTION
EF_VALUE: 55
EF_VALUE: 55

## 2024-12-11 NOTE — PROGRESS NOTES
Pulmonary/RT Note    Visit #        11/36       Wiliam Richardson is a 79 y.o. male presented today for pulmonary rehab. He states that there have been no changes in medication or health since the last visit.          He has a target heart rate of 103-118. He tolerated the exercise session well and has a pain level of 0. Patient states RPE for session today was 14 and RPD was a 3. Today the pt did the following.        Nustep: 10 min  Arm bike:10  min  Wallking: 10 min  Breathing retraining:15 min  Recovery and Monitoring:15 min             Physician available for emergencies: Gunnar HUGGINS, RT 12/11/2024 11:24 AM      
  Patient Treatment Goal --   Goal Status --   Progress Towards Goal --   Retired, Read Only Patient Stated Tobacco Goals --   Nutrition Assessment    Stages of Change Action   Nutrition Assessment Tool Rate Your Plate   Retired, Read Only Nutrition Assessment Tool --   Rate Your Plate Total Score --   Current Diet Balanced diet   Barriers to Heart Healthy Diet Limitied knowledge   Alcohol Yes   Type Beer, Liquor   Retired, Read Only Type --   Amount (Drinks Per Week) 1-2  [1-2 days week]   Height 1.753 m (5' 9\")   Weight 67 kg (147 lb 9.6 oz)   BMI 21.84   Waist Circumference (In) 34.5\"   % Body Fat --   Lipid Management Assessment    Stages of Change Maintenance   Date Lipids Drawn --  [No recent results]   Total --   HDL --   LDL --   Triglycerides --   Weight Management Assessment    Stages of Change Action   Height 1.753 m (5' 9\")   Weight 67 kg (147 lb 9.6 oz)   BMI 21.84   Waist Circumference (In) 34.5\"   % Body Fat Loss/Gain --   Current Weight Loss/Gain Strategies Adhere to heart healthy diet and exercise consistently   Barriers to Weight Loss/Gain --   Nutrition Intervention    Dietitian Consult No   Staff/Patient Discussion Yes   Supplemental Nutrition --   Diabetes Education Referral No   Lipid Clinic Referral No   Nutrition Education    Education --   Comments --   Nutrition Goals    Patient Target Goals LDL-C less than 70 and non-HDL-C <100 for those with ASCVD, Triglycerides less than 150, HbA1C less than 7 percent for those with diabetes, Waist less than 40 inches males, 35 for females, Weight loss of 5-10%   Patient Treatment Goal Pt will increase muscle mass and improve engery level by the end of the program   Goal Status --   Progress Towards Goal --   Patient Stated Nutrition Goals --   Education    Learning Barrier Ready to learn   Cardiac Knowledge Test Score --   Education Schedule Given Yes   Retired, Read Only Education Intervention    Education Schedule Given Yes   Patient Education

## 2024-12-13 ENCOUNTER — HOSPITAL ENCOUNTER (OUTPATIENT)
Facility: HOSPITAL | Age: 79
Setting detail: RECURRING SERIES
Discharge: HOME | End: 2024-12-16
Payer: MEDICARE

## 2024-12-13 PROCEDURE — G0237 THERAPEUTIC PROCD STRG ENDUR: HCPCS

## 2024-12-13 NOTE — PROGRESS NOTES
Pulmonary/RT Note    Visit #        12/36       Wiliam Richardson is a 79 y.o. male presented today for pulmonary rehab. He states that there have been no changes in medication or health since the last visit.          He has a target heart rate of 103-118. He tolerated the exercise session well and has a pain level of 0. Patient states RPE for session today was 14 and RPD was a 3. Today the pt did the following.        Nustep: 10 min  Arm bike:  10 min  Wallking: 10 min  Breathing retraining:15 min  Recovery and Monitoring:15 min             Physician available for emergencies: Gunnar HUGGINS, RT 12/13/2024 11:16 AM

## 2024-12-16 ENCOUNTER — HOSPITAL ENCOUNTER (OUTPATIENT)
Facility: HOSPITAL | Age: 79
Setting detail: RECURRING SERIES
Discharge: HOME | End: 2024-12-19
Payer: MEDICARE

## 2024-12-16 PROCEDURE — G0237 THERAPEUTIC PROCD STRG ENDUR: HCPCS

## 2024-12-16 PROCEDURE — G0239 OTH RESP PROC, GROUP: HCPCS

## 2024-12-16 NOTE — PROGRESS NOTES
Pulmonary/RT Note    Visit #        3/36       Wiliam Richardson is a 79 y.o. male presented today for pulmonary rehab. He states that there have been no changes in medication or health since the last visit.          He has a target heart rate of 103-118. He tolerated the exercise session well and has a pain level of 1. Patient states RPE for session today was 6 and RPD was a 6. Today the pt did the following.    Nustep: 10 min  Arm bike: 10 min  Wallking: 10 min  Breathing retraining:15 min  Recovery and Monitoring:15 min             Physician available for emergencies: Terry HUGGINS, RT 12/16/2024 11:04 AM

## 2024-12-18 ENCOUNTER — HOSPITAL ENCOUNTER (OUTPATIENT)
Facility: HOSPITAL | Age: 79
Setting detail: RECURRING SERIES
Discharge: HOME | End: 2024-12-21
Payer: MEDICARE

## 2024-12-18 PROCEDURE — G0237 THERAPEUTIC PROCD STRG ENDUR: HCPCS

## 2024-12-18 PROCEDURE — G0239 OTH RESP PROC, GROUP: HCPCS

## 2024-12-18 NOTE — PROGRESS NOTES
Pulmonary/RT Note    Visit #        35/36       Wiliam Richardson is a 79 y.o. male presented today for pulmonary rehab. He states that there have been no changes in medication or health since the last visit.          He has a target heart rate of . He tolerated the exercise session well and has a pain level of 3. Patient states RPE for session today was 14 and RPD was a 3. Today the pt did the following.        Nustep:10 min  Arm bike: 10 min  Wallking: 10 min  Breathing retraining:15 min  Recovery and Monitoring:15 min             Physician available for emergencies: Surya HUGGINS, RT 12/18/2024 11:25 AM

## 2024-12-20 ENCOUNTER — HOSPITAL ENCOUNTER (OUTPATIENT)
Facility: HOSPITAL | Age: 79
Setting detail: RECURRING SERIES
Discharge: HOME | End: 2024-12-23
Payer: MEDICARE

## 2024-12-20 PROCEDURE — G0237 THERAPEUTIC PROCD STRG ENDUR: HCPCS

## 2024-12-20 NOTE — PROGRESS NOTES
Pulmonary/RT Note    Visit #        15/36       Wiliam Richardson is a 79 y.o. male presented today for pulmonary rehab. He states that there have been no changes in medication or health since the last visit.          He has a target heart rate of 103-118. He tolerated the exercise session well and has a pain level of 0. Patient states RPE for session today was 4 and RPD was a 3. Today the pt did the following.        Nustep: 10 min  Arm bike: 10 min  Wallking: 10 min  Breathing retraining:15 min  Recovery and Monitoring:15 min             Physician available for emergencies: Surya HUGGINS, RT 12/20/2024 11:09 AM

## 2024-12-23 ENCOUNTER — HOSPITAL ENCOUNTER (OUTPATIENT)
Facility: HOSPITAL | Age: 79
Setting detail: RECURRING SERIES
Discharge: HOME | End: 2024-12-26
Payer: MEDICARE

## 2024-12-23 PROCEDURE — G0237 THERAPEUTIC PROCD STRG ENDUR: HCPCS

## 2024-12-23 NOTE — PROGRESS NOTES
Visit #        15/36         Wiliam Richardson is a 79 y.o. male presented today for pulmonary rehab. He states that there have been no changes in medication or health since the last visit.          He has a target heart rate of 103-118. He tolerated the exercise session well and has a pain level of 0. Patient states RPE for session today was 4 and RPD was a 3. Today the pt did the following.           Nustep: 10 min  Arm bike: 10 min  Wallking: 10 min  Breathing retraining:15 min  Recovery and Monitoring:15 min             Physician available for emergencies: Alvin Castaneda

## 2024-12-27 ENCOUNTER — HOSPITAL ENCOUNTER (OUTPATIENT)
Facility: HOSPITAL | Age: 79
Setting detail: RECURRING SERIES
Discharge: HOME | End: 2024-12-30
Payer: MEDICARE

## 2024-12-27 PROCEDURE — G0237 THERAPEUTIC PROCD STRG ENDUR: HCPCS

## 2024-12-27 NOTE — PROGRESS NOTES
Visit #        15/36         Wiliam Richardson is a 79 y.o. male presented today for pulmonary rehab. He states that there have been no changes in medication or health since the last visit.          He has a target heart rate of 103-118. He tolerated the exercise session well and has a pain level of 0. Patient states RPE for session today was 5 and RPD was a 7 while on Nustep, increased 02 to 3L NC for 02 sat 85%, increased to 91% on 3L.. Today the pt did the following.           Nustep: 10 min  Arm bike: 10 min  Wallking: 15 min  Breathing retraining:15 min  Recovery and Monitoring:15 min             Physician available for emergencies: Alvin Castaneda

## 2024-12-30 ENCOUNTER — HOSPITAL ENCOUNTER (OUTPATIENT)
Facility: HOSPITAL | Age: 79
Setting detail: RECURRING SERIES
Discharge: HOME | End: 2025-01-02
Payer: MEDICARE

## 2024-12-30 PROCEDURE — G0237 THERAPEUTIC PROCD STRG ENDUR: HCPCS

## 2024-12-30 PROCEDURE — G0239 OTH RESP PROC, GROUP: HCPCS

## 2024-12-30 NOTE — PROGRESS NOTES
Pulmonary/RT Note    Visit #        18/36       Wiliam Richardson is a 79 y.o. male presented today for pulmonary rehab. He states that there have been no changes in medication or health since the last visit.          He has a target heart rate of 103-118. He tolerated the exercise session well and has a pain level of 0. Patient states RPE for session today was 6 and RPD was a 5. Today the pt did the following.    Nustep:10 min  Arm bike: 10 min  Wallking:10 min  Breathing retraining:15 min  Recovery and Monitoring:15 min             Physician available for emergencies: ДМИТРИЙ HUGGINS, RT 12/30/2024 1:24 PM

## 2025-01-07 ENCOUNTER — HOSPITAL ENCOUNTER (OUTPATIENT)
Facility: HOSPITAL | Age: 80
Discharge: HOME | End: 2025-01-10
Payer: MEDICARE

## 2025-01-07 LAB
ALBUMIN SERPL-MCNC: 3.1 G/DL (ref 3.4–5)
ALBUMIN/GLOB SERPL: 0.8 (ref 0.8–1.7)
ALP SERPL-CCNC: 129 U/L (ref 45–117)
ALT SERPL-CCNC: 25 U/L (ref 16–61)
ANION GAP SERPL CALC-SCNC: 4 MMOL/L (ref 3–18)
AST SERPL-CCNC: 24 U/L (ref 10–38)
BASOPHILS # BLD: 0.03 K/UL (ref 0–0.1)
BASOPHILS NFR BLD: 0.3 % (ref 0–2)
BILIRUB SERPL-MCNC: 0.7 MG/DL (ref 0.2–1)
BUN SERPL-MCNC: 14 MG/DL (ref 7–18)
BUN/CREAT SERPL: 15 (ref 12–20)
CALCIUM SERPL-MCNC: 8.9 MG/DL (ref 8.5–10.1)
CHLORIDE SERPL-SCNC: 105 MMOL/L (ref 100–111)
CO2 SERPL-SCNC: 30 MMOL/L (ref 21–32)
CREAT SERPL-MCNC: 0.94 MG/DL (ref 0.6–1.3)
DIFFERENTIAL METHOD BLD: ABNORMAL
EOSINOPHIL # BLD: 0.09 K/UL (ref 0–0.4)
EOSINOPHIL NFR BLD: 0.8 % (ref 0–5)
ERYTHROCYTE [DISTWIDTH] IN BLOOD BY AUTOMATED COUNT: 15 % (ref 11.6–14.5)
GLOBULIN SER CALC-MCNC: 4.1 G/DL (ref 2–4)
GLUCOSE SERPL-MCNC: 93 MG/DL (ref 74–99)
HCT VFR BLD AUTO: 40 % (ref 36–48)
HGB BLD-MCNC: 12.7 G/DL (ref 13–16)
IMM GRANULOCYTES # BLD AUTO: 0.04 K/UL (ref 0–0.04)
IMM GRANULOCYTES NFR BLD AUTO: 0.3 % (ref 0–0.5)
LYMPHOCYTES # BLD: 1.12 K/UL (ref 0.9–3.3)
LYMPHOCYTES NFR BLD: 9.6 % (ref 21–52)
MCH RBC QN AUTO: 29 PG (ref 24–34)
MCHC RBC AUTO-ENTMCNC: 31.8 G/DL (ref 31–37)
MCV RBC AUTO: 91.3 FL (ref 78–100)
MONOCYTES # BLD: 1.03 K/UL (ref 0.05–1.2)
MONOCYTES NFR BLD: 8.8 % (ref 3–10)
NEUTS SEG # BLD: 9.4 K/UL (ref 1.8–8)
NEUTS SEG NFR BLD: 80.2 % (ref 40–73)
NRBC # BLD: 0 K/UL (ref 0–0.01)
NRBC BLD-RTO: 0 PER 100 WBC
NT PRO BNP: 452 PG/ML (ref 0–1800)
PLATELET # BLD AUTO: 241 K/UL (ref 135–420)
PMV BLD AUTO: 8.7 FL (ref 9.2–11.8)
POTASSIUM SERPL-SCNC: 4.2 MMOL/L (ref 3.5–5.5)
PROT SERPL-MCNC: 7.2 G/DL (ref 6.4–8.2)
RBC # BLD AUTO: 4.38 M/UL (ref 4.35–5.65)
SODIUM SERPL-SCNC: 139 MMOL/L (ref 136–145)
WBC # BLD AUTO: 11.7 K/UL (ref 4.6–13.2)

## 2025-01-07 PROCEDURE — 83880 ASSAY OF NATRIURETIC PEPTIDE: CPT

## 2025-01-07 PROCEDURE — 85025 COMPLETE CBC W/AUTO DIFF WBC: CPT

## 2025-01-07 PROCEDURE — 80053 COMPREHEN METABOLIC PANEL: CPT

## 2025-01-07 PROCEDURE — 36415 COLL VENOUS BLD VENIPUNCTURE: CPT

## 2025-01-08 LAB
FAX TO NUMBER: NORMAL
TEST RESULTS FAXED TO: NORMAL

## 2025-01-29 ENCOUNTER — TRANSCRIBE ORDERS (OUTPATIENT)
Facility: HOSPITAL | Age: 80
End: 2025-01-29

## 2025-01-29 DIAGNOSIS — J84.9 INTERSTITIAL LUNG DISORDERS (HCC): Primary | ICD-10-CM

## (undated) DEVICE — PRESSURE MONITORING SET: Brand: TRUWAVE

## (undated) DEVICE — ANGIOGRAPHIC CATHETER: Brand: EXPO™

## (undated) DEVICE — SPLINT WR POS F/ARTERIAL ACC -- BX/10

## (undated) DEVICE — SENSOR PLSE OXMTR AD CBL L36IN ADH FRM FIT SPO2 DISP

## (undated) DEVICE — PACK PROCEDURE SURG CATH CUST

## (undated) DEVICE — STOPCOCK TRNSDUC 500PSI 3 W ROT M LUER LT BLU OFF HNDL R

## (undated) DEVICE — TUBING PRSS MON L24IN PVC RIG NONEXPANDING M TO FEM CONN

## (undated) DEVICE — DRAPE,ANGIO,BRACH,STERILE,38X44: Brand: MEDLINE

## (undated) DEVICE — GLIDESHEATH SLENDER STAINLESS STEEL KIT: Brand: GLIDESHEATH SLENDER

## (undated) DEVICE — Device

## (undated) DEVICE — SHIELD RAD 14X16 IN W/ SCOOP ABSORBER

## (undated) DEVICE — PROCEDURE KIT FLUID MGMT 10 FR CUST MAINFOLD

## (undated) DEVICE — GUIDEWIRE VASC L260CM DIA0.035IN RAD 3MM J TIP L7CM PTFE